# Patient Record
Sex: MALE | Race: BLACK OR AFRICAN AMERICAN | NOT HISPANIC OR LATINO | URBAN - METROPOLITAN AREA
[De-identification: names, ages, dates, MRNs, and addresses within clinical notes are randomized per-mention and may not be internally consistent; named-entity substitution may affect disease eponyms.]

---

## 2017-05-15 ENCOUNTER — EMERGENCY (EMERGENCY)
Facility: HOSPITAL | Age: 57
LOS: 1 days | Discharge: HOME | End: 2017-05-15
Admitting: EMERGENCY MEDICINE
Payer: COMMERCIAL

## 2017-05-15 VITALS
DIASTOLIC BLOOD PRESSURE: 80 MMHG | TEMPERATURE: 98 F | OXYGEN SATURATION: 100 % | RESPIRATION RATE: 16 BRPM | SYSTOLIC BLOOD PRESSURE: 117 MMHG | HEART RATE: 73 BPM

## 2017-05-15 PROCEDURE — 99284 EMERGENCY DEPT VISIT MOD MDM: CPT | Mod: 25

## 2017-05-15 NOTE — ED ADULT TRIAGE NOTE - CHIEF COMPLAINT QUOTE
Pt. states " I have these small bumps all over my face . arms and head for the past 2 weeks". Pt. arrives with small bumps on right sided face , top of head and bilateral arms. Pt. states , bumps just appeared and states they have not gotten worse. Pt. denies any itchiness or pain , no discharge noted. Pt. denies any other c/o. States PMH is HTN.

## 2017-05-16 RX ORDER — MUPIROCIN 20 MG/G
1 OINTMENT TOPICAL
Qty: 1 | Refills: 0 | OUTPATIENT
Start: 2017-05-16

## 2017-05-16 NOTE — ED PROVIDER NOTE - PROGRESS NOTE DETAILS
The scribe's documentation has been prepared under my direction and personally reviewed by me in its entirety. I confirm that the note above accurately reflects all work, treatment, procedures, and medical decision making performed by me.  Parviz Mejia PA-C

## 2017-05-16 NOTE — ED PROVIDER NOTE - OBJECTIVE STATEMENT
56 y/o M pt with PMHx of HTN c/o progressively worsening, nonpruritic rash x2 weeks. Notes the rash first developed to face after shaving 2 weeks ago. Now notes small bumps to face, scalp, chest and upper extremities. Denies pain, bleeding, drainage, fever, chills, numbness/tingling, recent travel or any other complaints. NKDA

## 2017-05-16 NOTE — ED PROVIDER NOTE - NS ED MD SCRIBE ATTENDING SCRIBE SECTIONS
HIV/REVIEW OF SYSTEMS/VITAL SIGNS( Pullset)/PHYSICAL EXAM/PAST MEDICAL/SURGICAL/SOCIAL HISTORY/DISPOSITION/HISTORY OF PRESENT ILLNESS

## 2017-05-16 NOTE — ED PROVIDER NOTE - MEDICAL DECISION MAKING DETAILS
56 y/o M pt presents to the ED with rash x2 weeks. Probable folliculitis. Plan: Bactroban cream, dermatology f/u

## 2019-02-12 ENCOUNTER — INPATIENT (INPATIENT)
Facility: HOSPITAL | Age: 59
LOS: 2 days | Discharge: HOME | DRG: 309 | End: 2019-02-15
Attending: HOSPITALIST | Admitting: HOSPITALIST
Payer: COMMERCIAL

## 2019-02-12 VITALS
TEMPERATURE: 97 F | WEIGHT: 188.94 LBS | DIASTOLIC BLOOD PRESSURE: 79 MMHG | SYSTOLIC BLOOD PRESSURE: 115 MMHG | RESPIRATION RATE: 16 BRPM | OXYGEN SATURATION: 100 % | HEART RATE: 47 BPM

## 2019-02-12 DIAGNOSIS — I10 ESSENTIAL (PRIMARY) HYPERTENSION: ICD-10-CM

## 2019-02-12 DIAGNOSIS — R00.1 BRADYCARDIA, UNSPECIFIED: ICD-10-CM

## 2019-02-12 DIAGNOSIS — L73.9 FOLLICULAR DISORDER, UNSPECIFIED: ICD-10-CM

## 2019-02-12 DIAGNOSIS — Z29.9 ENCOUNTER FOR PROPHYLACTIC MEASURES, UNSPECIFIED: ICD-10-CM

## 2019-02-12 DIAGNOSIS — R55 SYNCOPE AND COLLAPSE: ICD-10-CM

## 2019-02-12 LAB
ALBUMIN SERPL ELPH-MCNC: 3.9 G/DL — SIGNIFICANT CHANGE UP (ref 3.3–5)
ALP SERPL-CCNC: 110 U/L — SIGNIFICANT CHANGE UP (ref 40–120)
ALT FLD-CCNC: 23 U/L — SIGNIFICANT CHANGE UP (ref 10–45)
ANION GAP SERPL CALC-SCNC: 18 MMOL/L — HIGH (ref 5–17)
APPEARANCE UR: CLEAR — SIGNIFICANT CHANGE UP
APTT BLD: 25.4 SEC — LOW (ref 27.5–36.3)
AST SERPL-CCNC: 31 U/L — SIGNIFICANT CHANGE UP (ref 10–40)
BASOPHILS # BLD AUTO: 0.1 K/UL — SIGNIFICANT CHANGE UP (ref 0–0.2)
BASOPHILS NFR BLD AUTO: 0.7 % — SIGNIFICANT CHANGE UP (ref 0–2)
BILIRUB SERPL-MCNC: 0.6 MG/DL — SIGNIFICANT CHANGE UP (ref 0.2–1.2)
BILIRUB UR-MCNC: NEGATIVE — SIGNIFICANT CHANGE UP
BLD GP AB SCN SERPL QL: NEGATIVE — SIGNIFICANT CHANGE UP
BUN SERPL-MCNC: 16 MG/DL — SIGNIFICANT CHANGE UP (ref 7–23)
CALCIUM SERPL-MCNC: 9.2 MG/DL — SIGNIFICANT CHANGE UP (ref 8.4–10.5)
CHLORIDE SERPL-SCNC: 100 MMOL/L — SIGNIFICANT CHANGE UP (ref 96–108)
CO2 SERPL-SCNC: 22 MMOL/L — SIGNIFICANT CHANGE UP (ref 22–31)
COLOR SPEC: YELLOW — SIGNIFICANT CHANGE UP
CREAT SERPL-MCNC: 1.16 MG/DL — SIGNIFICANT CHANGE UP (ref 0.5–1.3)
DIFF PNL FLD: NEGATIVE — SIGNIFICANT CHANGE UP
EOSINOPHIL # BLD AUTO: 0.3 K/UL — SIGNIFICANT CHANGE UP (ref 0–0.5)
EOSINOPHIL NFR BLD AUTO: 2.9 % — SIGNIFICANT CHANGE UP (ref 0–6)
ETHANOL SERPL-MCNC: SIGNIFICANT CHANGE UP MG/DL (ref 0–10)
GLUCOSE SERPL-MCNC: 116 MG/DL — HIGH (ref 70–99)
GLUCOSE UR QL: NEGATIVE — SIGNIFICANT CHANGE UP
HCT VFR BLD CALC: 50 % — SIGNIFICANT CHANGE UP (ref 39–50)
HGB BLD-MCNC: 17.4 G/DL — HIGH (ref 13–17)
INR BLD: 1.03 RATIO — SIGNIFICANT CHANGE UP (ref 0.88–1.16)
KETONES UR-MCNC: ABNORMAL
LEUKOCYTE ESTERASE UR-ACNC: NEGATIVE — SIGNIFICANT CHANGE UP
LYMPHOCYTES # BLD AUTO: 3.7 K/UL — HIGH (ref 1–3.3)
LYMPHOCYTES # BLD AUTO: 41 % — SIGNIFICANT CHANGE UP (ref 13–44)
MCHC RBC-ENTMCNC: 32.3 PG — SIGNIFICANT CHANGE UP (ref 27–34)
MCHC RBC-ENTMCNC: 34.7 GM/DL — SIGNIFICANT CHANGE UP (ref 32–36)
MCV RBC AUTO: 93 FL — SIGNIFICANT CHANGE UP (ref 80–100)
MONOCYTES # BLD AUTO: 0.9 K/UL — SIGNIFICANT CHANGE UP (ref 0–0.9)
MONOCYTES NFR BLD AUTO: 9.8 % — SIGNIFICANT CHANGE UP (ref 2–14)
NEUTROPHILS # BLD AUTO: 4.1 K/UL — SIGNIFICANT CHANGE UP (ref 1.8–7.4)
NEUTROPHILS NFR BLD AUTO: 45.5 % — SIGNIFICANT CHANGE UP (ref 43–77)
NITRITE UR-MCNC: NEGATIVE — SIGNIFICANT CHANGE UP
PH UR: 5.5 — SIGNIFICANT CHANGE UP (ref 5–8)
PLATELET # BLD AUTO: 238 K/UL — SIGNIFICANT CHANGE UP (ref 150–400)
POTASSIUM SERPL-MCNC: 3.5 MMOL/L — SIGNIFICANT CHANGE UP (ref 3.5–5.3)
POTASSIUM SERPL-SCNC: 3.5 MMOL/L — SIGNIFICANT CHANGE UP (ref 3.5–5.3)
PROT SERPL-MCNC: 7.9 G/DL — SIGNIFICANT CHANGE UP (ref 6–8.3)
PROT UR-MCNC: ABNORMAL
PROTHROM AB SERPL-ACNC: 11.8 SEC — SIGNIFICANT CHANGE UP (ref 10–12.9)
RBC # BLD: 5.38 M/UL — SIGNIFICANT CHANGE UP (ref 4.2–5.8)
RBC # FLD: 13 % — SIGNIFICANT CHANGE UP (ref 10.3–14.5)
RH IG SCN BLD-IMP: POSITIVE — SIGNIFICANT CHANGE UP
SODIUM SERPL-SCNC: 140 MMOL/L — SIGNIFICANT CHANGE UP (ref 135–145)
SP GR SPEC: 1.02 — SIGNIFICANT CHANGE UP (ref 1.01–1.02)
TSH SERPL-MCNC: 2.28 UIU/ML — SIGNIFICANT CHANGE UP (ref 0.27–4.2)
UROBILINOGEN FLD QL: NEGATIVE — SIGNIFICANT CHANGE UP
WBC # BLD: 9 K/UL — SIGNIFICANT CHANGE UP (ref 3.8–10.5)
WBC # FLD AUTO: 9 K/UL — SIGNIFICANT CHANGE UP (ref 3.8–10.5)

## 2019-02-12 PROCEDURE — 99255 IP/OBS CONSLTJ NEW/EST HI 80: CPT | Mod: GC

## 2019-02-12 PROCEDURE — 12002 RPR S/N/AX/GEN/TRNK2.6-7.5CM: CPT

## 2019-02-12 PROCEDURE — 99285 EMERGENCY DEPT VISIT HI MDM: CPT | Mod: 25

## 2019-02-12 PROCEDURE — 71045 X-RAY EXAM CHEST 1 VIEW: CPT | Mod: 26

## 2019-02-12 PROCEDURE — 70450 CT HEAD/BRAIN W/O DYE: CPT | Mod: 26

## 2019-02-12 PROCEDURE — 99223 1ST HOSP IP/OBS HIGH 75: CPT

## 2019-02-12 PROCEDURE — 72125 CT NECK SPINE W/O DYE: CPT | Mod: 26

## 2019-02-12 PROCEDURE — 93010 ELECTROCARDIOGRAM REPORT: CPT | Mod: 59

## 2019-02-12 PROCEDURE — 72170 X-RAY EXAM OF PELVIS: CPT | Mod: 26

## 2019-02-12 RX ORDER — CEPHALEXIN 500 MG
500 CAPSULE ORAL
Qty: 0 | Refills: 0 | Status: DISCONTINUED | OUTPATIENT
Start: 2019-02-12 | End: 2019-02-14

## 2019-02-12 RX ORDER — SODIUM CHLORIDE 9 MG/ML
1000 INJECTION INTRAMUSCULAR; INTRAVENOUS; SUBCUTANEOUS
Qty: 0 | Refills: 0 | Status: DISCONTINUED | OUTPATIENT
Start: 2019-02-12 | End: 2019-02-14

## 2019-02-12 RX ORDER — SODIUM CHLORIDE 9 MG/ML
1000 INJECTION INTRAMUSCULAR; INTRAVENOUS; SUBCUTANEOUS ONCE
Qty: 0 | Refills: 0 | Status: COMPLETED | OUTPATIENT
Start: 2019-02-12 | End: 2019-02-12

## 2019-02-12 RX ORDER — ACETAMINOPHEN 500 MG
650 TABLET ORAL EVERY 6 HOURS
Qty: 0 | Refills: 0 | Status: DISCONTINUED | OUTPATIENT
Start: 2019-02-12 | End: 2019-02-15

## 2019-02-12 RX ORDER — TETANUS TOXOID, REDUCED DIPHTHERIA TOXOID AND ACELLULAR PERTUSSIS VACCINE, ADSORBED 5; 2.5; 8; 8; 2.5 [IU]/.5ML; [IU]/.5ML; UG/.5ML; UG/.5ML; UG/.5ML
0.5 SUSPENSION INTRAMUSCULAR ONCE
Qty: 0 | Refills: 0 | Status: COMPLETED | OUTPATIENT
Start: 2019-02-12 | End: 2019-02-12

## 2019-02-12 RX ADMIN — SODIUM CHLORIDE 50 MILLILITER(S): 9 INJECTION INTRAMUSCULAR; INTRAVENOUS; SUBCUTANEOUS at 17:49

## 2019-02-12 RX ADMIN — Medication 500 MILLIGRAM(S): at 17:38

## 2019-02-12 RX ADMIN — TETANUS TOXOID, REDUCED DIPHTHERIA TOXOID AND ACELLULAR PERTUSSIS VACCINE, ADSORBED 0.5 MILLILITER(S): 5; 2.5; 8; 8; 2.5 SUSPENSION INTRAMUSCULAR at 03:02

## 2019-02-12 RX ADMIN — SODIUM CHLORIDE 3000 MILLILITER(S): 9 INJECTION INTRAMUSCULAR; INTRAVENOUS; SUBCUTANEOUS at 03:29

## 2019-02-12 NOTE — H&P ADULT - HISTORY OF PRESENT ILLNESS
58 year old female with PMH of HTN; presented after fall down stairs at home with some head trauma and LOC after feeling dizzy. Patient restarted taking his BP meds (atenolol-chlorthalidone) about 3 days ago after not taking for about 8 months or so. He took them on his own, he did not see or speak with his physician. In ED he had a posterior head laceration s/p staples. He denies CP, SOB, N/V, diarrhea, cough, fevers, dysuria, HA, weakness, or vision changes. He says his son was there when he fell and him and his wife brought him to the hospital.   In ED patient given 1LNS bolus and Tdap. 58 year old female with PMH of HTN; presented after fall down stairs at home with some head trauma and LOC after feeling dizzy. Patient restarted taking his BP meds (atenolol-chlorthalidone) about 3 days ago after not taking for about 8 months or so. He took them on his own, he did not see or speak with his physician. In ED he had a posterior head laceration s/p staples. He denies CP, SOB, N/V, diarrhea, cough, fevers, dysuria, HA, weakness, or vision changes. He says his son was there when he fell and him and his wife brought him to the hospital. In ED he was found to be bradycardic.   In ED patient given 1LNS bolus and Tdap.

## 2019-02-12 NOTE — ED PROCEDURE NOTE - ATTENDING CONTRIBUTION TO CARE
I supervised the procedure performed by the resident. I reviewed the resident's note and agree with the documented findings.

## 2019-02-12 NOTE — H&P ADULT - NSHPLABSRESULTS_GEN_ALL_CORE
LABS:                        17.4   9.0   )-----------( 238      ( 2019 03:14 )             50.0           140  |  100  |  16  ----------------------------<  116<H>  3.5   |  22  |  1.16    Ca    9.2      2019 03:14    TPro  7.9  /  Alb  3.9  /  TBili  0.6  /  DBili  x   /  AST  31  /  ALT  23  /  AlkPhos  110  12          CAPILLARY BLOOD GLUCOSE      POCT Blood Glucose.: 90 mg/dL (2019 01:53)      PT/INR - ( 2019 03:14 )   PT: 11.8 sec;   INR: 1.03 ratio         PTT - ( 2019 03:14 )  PTT:25.4 sec    Lactate Trend    Urinalysis Basic - ( 2019 08:41 )    Color: Yellow / Appearance: Clear / S.019 / pH: x  Gluc: x / Ketone: Small  / Bili: Negative / Urobili: Negative   Blood: x / Protein: Trace / Nitrite: Negative   Leuk Esterase: Negative / RBC: 2 /hpf / WBC 3 /HPF   Sq Epi: x / Non Sq Epi: 1 /hpf / Bacteria: Negative    RADIOLOGY:    < from: CT Head No Cont (19 @ 02:48) >    IMPRESSION:     Head CT: No evidence for calvarial fracture or acute intracranial   hemorrhage. If symptoms persist, consider short interval follow-up head   CT or brain MRI follow-up if there are no MRI contraindications.    Cervical spine CT: No evidence for acute cervical spine fracture.   Degenerative changes as described. If symptoms persist, consider cervical   spine MRI follow-up if there are no MRI contraindications. Flexion and   extension radiographs may also be of value.    < end of copied text >    < from: Xray Pelvis AP only (19 @ 02:56) >    IMPRESSION:    No acute pelvic ring fracture.    < end of copied text >    < from: Xray Chest 1 View AP/PA (19 @ 02:55) >    IMPRESSION:  No acute pulmonary disease.    < end of copied text >    CARDIOLOGY:    EKG reviewed by me: Sinus azeb HR 47, Qtc 412, Early repolarization.     Telemetry reviewed by me: Sinus 40-70's.

## 2019-02-12 NOTE — H&P ADULT - NSHPREVIEWOFSYSTEMS_GEN_ALL_CORE
REVIEW OF SYSTEMS:    CONSTITUTIONAL: Was having dizziness. No weakness, fevers or chills  ENMT:  No ear pain, No vertigo or throat pain  EYES: No visual changes  or photophobia  NECK: No pain or stiffness  RESPIRATORY: No cough, wheezing, hemoptysis; No shortness of breath  CARDIOVASCULAR: No chest pain or palpitations  GASTROINTESTINAL: No abdominal or epigastric pain. No nausea, vomiting, or hematemesis; No diarrhea or constipation. No melena or hematochezia.  MUSCULOSKELETAL: No joint swelling or warmth.  GENITOURINARY: No dysuria, frequency or hematuria  NEUROLOGICAL: No numbness or weakness  PSYCHIATRIC: No depression or anhedonia.  ENDOCRINE: No sx hypoglycemic episodes.  SKIN: Small skin lesion superior to umbilicus. Posterior head laceration.

## 2019-02-12 NOTE — H&P ADULT - PROBLEM SELECTOR PLAN 2
-Hold home atenolol-chlorthalidone for now and observe.   -If patient still needs antihypertensive, will likely start something low dose which doesn't affect the heart rate.

## 2019-02-12 NOTE — CONSULT NOTE ADULT - ASSESSMENT
Pt is a 58 year old man with PMHx of HTN, presenting after fall down stairs at home with some head trauma and LOC after feeling dizzy, found to be bradycardic in the ED.    #Syncope: Bradycardic to 30s in the ED in the setting of restarting his atenolol. Denies chest pain, shortness of breath. No previous hx of syncope.   -Hold any AVN blocking agent  -TTE in the AM  -Telemetry monitoring

## 2019-02-12 NOTE — H&P ADULT - NSHPSOCIALHISTORY_GEN_ALL_CORE
and has a son. Says he lives with his family. Drinks alcohol about twice per week. Smokes pot occasionally. No tobacco use. Retired .

## 2019-02-12 NOTE — H&P ADULT - PROBLEM SELECTOR PLAN 4
-SCD's for DVT PPx.   -Ambulate with assistance. Fall precautions.   -Consider PT eval if patient doesn't improve after withholding BB.    5. Dispo: -Explained to patient we should observe at least a day until his HR and symptoms improve. He will also need close outpatient PMD follow up within a week.

## 2019-02-12 NOTE — CONSULT NOTE ADULT - SUBJECTIVE AND OBJECTIVE BOX
Patient seen and evaluated at bedside    Chief Complaint: Fall at home    HPI:  Pt is a 58 year old man with PMHx of HTN, presenting after fall down stairs at home with some head trauma and LOC after feeling dizzy. Patient restarted taking his BP meds (atenolol-chlorthalidone) about 3 days ago after not taking for about 8 months or so. He took them on his own, he did not see or speak with his physician. In ED he had a posterior head laceration s/p staples. He denies CP, SOB, N/V, diarrhea, cough, fevers, dysuria, HA, weakness, or vision changes. He says his son was there when he fell and him and his wife brought him to the hospital. In ED he was found to be bradycardic to 30-50s, otherwise HDS. HsTrop <6. Given 1 L NS      PMH:   Essential hypertension      PSH:   No significant past surgical history      Medications:   acetaminophen   Tablet .. 650 milliGRAM(s) Oral every 6 hours PRN  cephalexin 500 milliGRAM(s) Oral four times a day      Allergies:  No Known Allergies      FAMILY HISTORY:  Family history of diabetes mellitus (Father)      Social History:  Smoking History:  Alcohol Use:  Drug Use:    Review of Systems:  REVIEW OF SYSTEMS:  CONSTITUTIONAL: No weakness, fevers or chills  EYES/ENT: No visual changes;  No dysphagia  NECK: No pain or stiffness  RESPIRATORY: No cough, wheezing, hemoptysis; No shortness of breath  CARDIOVASCULAR: No chest pain or palpitations; No lower extremity edema  GASTROINTESTINAL: No abdominal or epigastric pain. No nausea, vomiting, or hematemesis; No diarrhea or constipation. No melena or hematochezia.  BACK: No back pain  GENITOURINARY: No dysuria, frequency or hematuria  NEUROLOGICAL: No numbness or weakness  SKIN: No itching, burning, rashes, or lesions   All other review of systems is negative unless indicated above.    Physical Exam:  T(F): 97.5 (02-12), Max: 98.1 (02-12)  HR: 57 (02-12) (47 - 57)  BP: 146/86 (02-12) (115/79 - 147/80)  RR: 16 (02-12)  SpO2: 100% (02-12)  GENERAL: No acute distress, well-developed  HEAD:  Dressing covering posterior head laceration  ENT: Neck supple, No JVD, moist mucosa  CHEST/LUNG: Clear to auscultation bilaterally; No wheeze, equal breath sounds bilaterally   HEART: Bradycardic, regular; No murmurs, rubs, or gallops  ABDOMEN: Soft, Nontender, Nondistended; Bowel sounds present  EXTREMITIES:  No clubbing, cyanosis, or edema  PSYCH: Nl behavior, nl affect  SKIN: Normal color, No rashes or lesions  LINES:    Cardiovascular Diagnostic Testing:    ECG: Personally reviewed    Echo:    Stress Testing:    Cath:    Interpretation of Telemetry:    Imaging:    Labs: Personally reviewed                        17.4   9.0   )-----------( 238      ( 12 Feb 2019 03:14 )             50.0     02-12    140  |  100  |  16  ----------------------------<  116<H>  3.5   |  22  |  1.16    Ca    9.2      12 Feb 2019 03:14    TPro  7.9  /  Alb  3.9  /  TBili  0.6  /  DBili  x   /  AST  31  /  ALT  23  /  AlkPhos  110  02-12    PT/INR - ( 12 Feb 2019 03:14 )   PT: 11.8 sec;   INR: 1.03 ratio         PTT - ( 12 Feb 2019 03:14 )  PTT:25.4 sec

## 2019-02-12 NOTE — ED PROVIDER NOTE - CLINICAL SUMMARY MEDICAL DECISION MAKING FREE TEXT BOX
DDx: syncope, head laceration. Will order cardiac workup, close hemodynamic monitoring, reassess. Will need admission.  ATTG: Dr. Samano

## 2019-02-12 NOTE — ED ADULT NURSE REASSESSMENT NOTE - NS ED NURSE REASSESS COMMENT FT1
Report received from RN Lorena Yusuf and DUNCAN Arredondo in critical care. Patient appears to be resting comfortably in stretcher. Patient denies any dizziness, n/v/d, numbness, tingling, SOB, vision changes, headache. Cardiac monitor in place. Patient remains sinus bradycardic to 50s, ED MD Barnes aware. A&OX3 with intermittent periods of confusion. Safety and comfort measures provided.

## 2019-02-12 NOTE — CONSULT NOTE ADULT - ATTENDING COMMENTS
This note was reviewed, amended and signed yesterday, unclear why the signed note has vanished. As stated yesterday:  58 year old man presents with traumatic syncope, observe sinus bradycardia, occurring within a day of resuming atenolol which he had stopped months earlier. Exam otherwise unremarkable. Need cardiac echo to evaluate LV function and monitor on telemetry for arrhythmias, symptomatic bradycardia, heart block.

## 2019-02-12 NOTE — H&P ADULT - PROBLEM SELECTOR PLAN 1
-S/p fall at home after becoming dizzy in setting of recently resuming atenolol-chlorthalidone on his own as an outpatient. He says he was taking ?BID atenolol 100mg, which is a high dose. -Likely bradycardia and dizziness/fall due to excessive beta-blocker.   -Trops negative, no ischemic changes seen on EKG. -CT head negative for any acute findings. Repeat CT head if worsens.   -Hold beta-blocker and antihypertensives for now.   -C/w telemetry.   -Consider echo and cardiology eval if HR and symptoms don't improve after withholding BB.   -F/u TSH.   -Check orthostatics.  -S/p posterior head laceration stapling in ED. Will need to return to have staples removed in about 10 days.

## 2019-02-12 NOTE — ED PROVIDER NOTE - OBJECTIVE STATEMENT
Resident: 58y M PMH HTN BiBEMS from home after fall down stairs. Was walking down stairs when became dizzy and fell down approx 7 steps, +loc. Required assistance to get up to standing, had been ambulatory since event. Came to emergency department due to laceration on scalp.

## 2019-02-12 NOTE — H&P ADULT - NSHPPHYSICALEXAM_GEN_ALL_CORE
PHYSICAL EXAM:  Vital Signs Last 24 Hrs  T(C): 36.7 (02-12-19 @ 11:59)  T(F): 98.1 (02-12-19 @ 11:59), Max: 98.1 (02-12-19 @ 11:59)  HR: 55 (02-12-19 @ 11:59) (47 - 55)  BP: 147/80 (02-12-19 @ 11:59)  BP(mean): --  RR: 16 (02-12-19 @ 11:59) (16 - 17)  SpO2: 98% (02-12-19 @ 11:59) (98% - 100%)  Wt(kg): --    Constitutional: NAD, awake and alert  EYES: EOMI  ENT:  Normal Hearing   Neck: Soft and supple  Respiratory: Clear except mildly reduced BS on left side.   Cardiovascular: S1S2 normal, bradycardic rate and rhythm, no Murmurs, gallops or rubs  Gastrointestinal: Bowel Sounds present, soft, nontender, nondistended, no guarding, no rebound  Extremities: No LE edema; warm to touch  Neurological: AAO x 3, no focal deficits  Musculoskeletal: 5/5 strength b/l upper and lower extremities  Skin: Small areas of folliculitis/early cellulitis superior to umbilicus. Posterior head laceration s/p staples.   Psych: No depression or anhedonia  Heme: No nose bleeds

## 2019-02-12 NOTE — ED PROVIDER NOTE - PHYSICAL EXAMINATION
Resident:   Gen: no acute distress  Head: 5cm linear lac posterior scalp  ENT: PERRL, MMM  Neck: supple with full ROM   Chest: CTAB, no retractions, rate normal, appears to breathe comfortably  Heart: azeb regular S1S2, No peripheral edema, bilateral pulses in arms and legs  Abd: Soft non-tender, no rebound or guarding  Back: No spinal deformity, no CVAT  Ext: Moving all 4 extremities without obvious impairment to ROM, no obvious weakness  Neuro: fluid speech  Psych: No anxiety, depression or pressured speech noted  Skin: abrasion right posterior shoulder

## 2019-02-12 NOTE — H&P ADULT - ATTENDING COMMENTS
Hermann Granger MD  Division of [facility] Medicine  Cell: [phone]  Pager: [phone]  Office: [phone]

## 2019-02-12 NOTE — ED PROVIDER NOTE - NS ED ROS FT
Constitutional: no fever, no chills.  Eyes: no visual changes.  ENMT: no sore throat.  CV: no chest pain.  Resp: no cough, no shortness of breath.  GI: no abdominal pain, no nausea, no vomiting, no diarrhea.  : no dysuria, no hematuria.  MSK: no back pain, no neck pain.  Skin: no rashes.  Neuro: no headache, +loss of consciousness, no weakness, no numbness, no tingling.

## 2019-02-13 LAB
ANION GAP SERPL CALC-SCNC: 15 MMOL/L — SIGNIFICANT CHANGE UP (ref 5–17)
BUN SERPL-MCNC: 14 MG/DL — SIGNIFICANT CHANGE UP (ref 7–23)
CALCIUM SERPL-MCNC: 9.4 MG/DL — SIGNIFICANT CHANGE UP (ref 8.4–10.5)
CHLORIDE SERPL-SCNC: 102 MMOL/L — SIGNIFICANT CHANGE UP (ref 96–108)
CO2 SERPL-SCNC: 22 MMOL/L — SIGNIFICANT CHANGE UP (ref 22–31)
CREAT SERPL-MCNC: 0.95 MG/DL — SIGNIFICANT CHANGE UP (ref 0.5–1.3)
GLUCOSE SERPL-MCNC: 77 MG/DL — SIGNIFICANT CHANGE UP (ref 70–99)
HCT VFR BLD CALC: 51 % — HIGH (ref 39–50)
HGB BLD-MCNC: 17.2 G/DL — HIGH (ref 13–17)
MAGNESIUM SERPL-MCNC: 2 MG/DL — SIGNIFICANT CHANGE UP (ref 1.6–2.6)
MCHC RBC-ENTMCNC: 31.3 PG — SIGNIFICANT CHANGE UP (ref 27–34)
MCHC RBC-ENTMCNC: 33.7 GM/DL — SIGNIFICANT CHANGE UP (ref 32–36)
MCV RBC AUTO: 92.8 FL — SIGNIFICANT CHANGE UP (ref 80–100)
PHOSPHATE SERPL-MCNC: 2.6 MG/DL — SIGNIFICANT CHANGE UP (ref 2.5–4.5)
PLATELET # BLD AUTO: 194 K/UL — SIGNIFICANT CHANGE UP (ref 150–400)
POTASSIUM SERPL-MCNC: 3.3 MMOL/L — LOW (ref 3.5–5.3)
POTASSIUM SERPL-SCNC: 3.3 MMOL/L — LOW (ref 3.5–5.3)
RBC # BLD: 5.5 M/UL — SIGNIFICANT CHANGE UP (ref 4.2–5.8)
RBC # FLD: 13.1 % — SIGNIFICANT CHANGE UP (ref 10.3–14.5)
SODIUM SERPL-SCNC: 139 MMOL/L — SIGNIFICANT CHANGE UP (ref 135–145)
WBC # BLD: 6.3 K/UL — SIGNIFICANT CHANGE UP (ref 3.8–10.5)
WBC # FLD AUTO: 6.3 K/UL — SIGNIFICANT CHANGE UP (ref 3.8–10.5)

## 2019-02-13 PROCEDURE — 99233 SBSQ HOSP IP/OBS HIGH 50: CPT | Mod: GC

## 2019-02-13 PROCEDURE — 93010 ELECTROCARDIOGRAM REPORT: CPT

## 2019-02-13 PROCEDURE — 99233 SBSQ HOSP IP/OBS HIGH 50: CPT

## 2019-02-13 RX ORDER — POTASSIUM CHLORIDE 20 MEQ
40 PACKET (EA) ORAL EVERY 4 HOURS
Qty: 0 | Refills: 0 | Status: COMPLETED | OUTPATIENT
Start: 2019-02-13 | End: 2019-02-13

## 2019-02-13 RX ADMIN — Medication 40 MILLIEQUIVALENT(S): at 09:10

## 2019-02-13 RX ADMIN — Medication 500 MILLIGRAM(S): at 00:11

## 2019-02-13 RX ADMIN — Medication 500 MILLIGRAM(S): at 23:11

## 2019-02-13 RX ADMIN — Medication 40 MILLIEQUIVALENT(S): at 13:15

## 2019-02-13 RX ADMIN — Medication 500 MILLIGRAM(S): at 05:38

## 2019-02-13 RX ADMIN — Medication 500 MILLIGRAM(S): at 11:06

## 2019-02-13 RX ADMIN — Medication 500 MILLIGRAM(S): at 17:09

## 2019-02-13 NOTE — PROGRESS NOTE ADULT - SUBJECTIVE AND OBJECTIVE BOX
Patient is a 58y old  Male who presents with a chief complaint of S/p fall with dizziness (2019 09:49)      SUBJECTIVE / OVERNIGHT EVENTS: sinus 48s on tele, no cp, sob, chills, abdominal pain     MEDICATIONS  (STANDING):  cephalexin 500 milliGRAM(s) Oral four times a day  sodium chloride 0.9%. 1000 milliLiter(s) (50 mL/Hr) IV Continuous <Continuous>    MEDICATIONS  (PRN):  acetaminophen   Tablet .. 650 milliGRAM(s) Oral every 6 hours PRN Mild Pain (1 - 3), Moderate Pain (4 - 6)        CAPILLARY BLOOD GLUCOSE        I&O's Summary    2019 07:  -  2019 07:00  --------------------------------------------------------  IN: 840 mL / OUT: 550 mL / NET: 290 mL    2019 07:01  -  2019 15:22  --------------------------------------------------------  IN: 480 mL / OUT: 0 mL / NET: 480 mL        PHYSICAL EXAM:  GENERAL: NAD, well-developed  HEAD:  Atraumatic, Normocephalic  EYES: EOMI, PERRLA, conjunctiva and sclera clear  NECK: Supple, No JVD  CHEST/LUNG: Clear to auscultation bilaterally; No wheeze  HEART: Regular rate and rhythm; No murmurs, rubs, or gallops  ABDOMEN: Soft, Nontender, Nondistended; Bowel sounds present  EXTREMITIES:  2+ Peripheral Pulses, No clubbing, cyanosis, or edema  PSYCH: AAOx3  NEUROLOGY: non-focal  SKIN: No rashes or lesions    LABS:                        17.2   6.3   )-----------( 194      ( 2019 07:17 )             51.0     02-13    139  |  102  |  14  ----------------------------<  77  3.3<L>   |  22  |  0.95    Ca    9.4      2019 07:15  Phos  2.6     02-13  Mg     2.0     02-13    TPro  7.9  /  Alb  3.9  /  TBili  0.6  /  DBili  x   /  AST  31  /  ALT  23  /  AlkPhos  110  02-12    PT/INR - ( 2019 03:14 )   PT: 11.8 sec;   INR: 1.03 ratio         PTT - ( 2019 03:14 )  PTT:25.4 sec      Urinalysis Basic - ( 2019 08:41 )    Color: Yellow / Appearance: Clear / S.019 / pH: x  Gluc: x / Ketone: Small  / Bili: Negative / Urobili: Negative   Blood: x / Protein: Trace / Nitrite: Negative   Leuk Esterase: Negative / RBC: 2 /hpf / WBC 3 /HPF   Sq Epi: x / Non Sq Epi: 1 /hpf / Bacteria: Negative        RADIOLOGY & ADDITIONAL TESTS:    Imaging Personally Reviewed:    Consultant(s) Notes Reviewed:  cardio    Care Discussed with Consultants/Other Providers:

## 2019-02-13 NOTE — PROGRESS NOTE ADULT - PROBLEM SELECTOR PLAN 1
-S/p fall at home after becoming dizzy in setting of recently resuming atenolol-chlorthalidone on his own as an outpatient. He says he was taking ?BID atenolol 100mg, which is a high dose.  -Likely bradycardia and dizziness/fall due to excessive beta-blocker.   -Trops negative, no ischemic changes seen on EKG.   -CT head negative for any acute findings.   -Hold beta-blocker/ av chepe blocking agents   -C/w telemetry.   -check echo  -cards follow up   -F/u TSH.   -orthostatics negative   -S/p posterior head laceration stapling in ED. Will need to return to have staples removed in about 10 days.

## 2019-02-13 NOTE — PROGRESS NOTE ADULT - PROBLEM SELECTOR PLAN 3
-Patient has small area of folliculitis/early cellulitic changes above his navel. He says some pus has come out before, but not currently on my exam. He says he may have shaved the area recently. Possibly related to this.   -Will treat with 5 day course of Keflex.   -Advised him to follow up with PMD next week and also keep the area clean.

## 2019-02-13 NOTE — PROGRESS NOTE ADULT - ASSESSMENT
Pt is a 58 year old man with PMHx of HTN, presenting after fall down stairs at home with some head trauma and LOC after feeling dizzy in the setting of restarting his atenolol 24 hours prior, found to be bradycardic in the ED.    #Syncope: Bradycardic to 30s in the ED, now 40-60's with occasional drop to the 30's.  -Hold any AVN blocking agent  -TTE today  -c/w Telemetry monitoring

## 2019-02-13 NOTE — PROVIDER CONTACT NOTE (OTHER) - ASSESSMENT
Patient A&Ox4, VSS. HR 57 sinus bradycardia, /77, RR 17, O2 saturation 98% on room air. Patient was asleep at time of event. Patient easily arousable and denies chest pain, lightheadedness, or shortness of breath. Patient is asymptomatic. Patient has R2 Pads on.

## 2019-02-13 NOTE — CHART NOTE - NSCHARTNOTEFT_GEN_A_CORE
[facility] Medicine Event Note    Notified by RN for episode of bradycardia on tele, down to 38 back to high 40s. Pt asymptomatic and sleeping at time.  Will check labs, R2 pads, EKG   briefly went down to 38 on tele, back to high 40s;will check labs;R2 pads, EKG    Jill Topete PA-C  Dept of Medicine  [id number]

## 2019-02-13 NOTE — PROGRESS NOTE ADULT - SUBJECTIVE AND OBJECTIVE BOX
Patient seen and examined at bedside.    Overnight Events: No acute events overnight. Pt seen and examined at bedside, laying comfortably in NAD.  Pt jacqueline chest pain, palpitations, shortness of breath, lightheadedness, dizziness, headache, abd pain pain, nausea, vomiting or diaphoresis.     Review Of Systems: No chest pain, shortness of breath, or palpitations            Medications:  acetaminophen   Tablet .. 650 milliGRAM(s) Oral every 6 hours PRN  cephalexin 500 milliGRAM(s) Oral four times a day  potassium chloride    Tablet ER 40 milliEquivalent(s) Oral every 4 hours  sodium chloride 0.9%. 1000 milliLiter(s) IV Continuous <Continuous>      PAST MEDICAL & SURGICAL HISTORY:  Essential hypertension  No significant past surgical history      Vitals:  T(F): 98.6 (02-13), Max: 98.6 (02-13)  HR: 57 (02-13) (48 - 60)  BP: 135/77 (02-13) (113/73 - 162/87)  RR: 17 (02-13)  SpO2: 98% (02-13)  I&O's Summary    12 Feb 2019 07:01  -  13 Feb 2019 07:00  --------------------------------------------------------  IN: 840 mL / OUT: 550 mL / NET: 290 mL        Physical Exam:  Appearance: No acute distress; well appearing  Eyes: PERRL, EOMI, pink conjunctiva  HENT:  posterior head laceration.  Cardiovascular: RRR, S1, S2, no murmurs, rubs, or gallops; no edema; no JVD  Respiratory: Clear to auscultation bilaterally  Gastrointestinal: soft, non-tender, non-distended with normal bowel sounds  Musculoskeletal: No clubbing; no joint deformity   Neurologic: Non-focal  Lymphatic: No lymphadenopathy  Psychiatry: AAOx3, mood & affect appropriate  Skin: No rashes, ecchymoses, or cyanosis                          17.2   6.3   )-----------( 194      ( 13 Feb 2019 07:17 )             51.0     02-13    139  |  102  |  14  ----------------------------<  77  3.3<L>   |  22  |  0.95    Ca    9.4      13 Feb 2019 07:15  Phos  2.6     02-13  Mg     2.0     02-13    TPro  7.9  /  Alb  3.9  /  TBili  0.6  /  DBili  x   /  AST  31  /  ALT  23  /  AlkPhos  110  02-12    PT/INR - ( 12 Feb 2019 03:14 )   PT: 11.8 sec;   INR: 1.03 ratio         PTT - ( 12 Feb 2019 03:14 )  PTT:25.4 sec              New ECG(s): Personally reviewed    Echo:    Stress Testing:     Cath:    Imaging:    Interpretation of Telemetry:  sinus rythm 40-60's with occasional rates in the 30's, no evidence of heart block. Patient seen and examined at bedside.    Overnight Events: No acute events overnight. Pt seen and examined at bedside, laying comfortably in NAD.  Pt jacqueline chest pain, palpitations, shortness of breath, lightheadedness, dizziness, headache, abd pain pain, nausea, vomiting or diaphoresis.     Review Of Systems:   CONSTITUTIONAL: no fevers or chills  EYES/ENT: No visual changes;  No vertigo or throat pain   NECK: No pain or stiffness  RESPIRATORY: No cough, wheezing. No shortness of breath  CARDIOVASCULAR: No chest pain or palpitations  GASTROINTESTINAL: No abdominal or epigastric pain. No nausea, vomiting. No diarrhea. No melena.  GENITOURINARY: No dysuria, frequency or hematuria  NEUROLOGICAL: No numbness or weakness  SKIN: No itching, burning, rashes, or lesions   All other review of systems is negative unless indicated above.            Medications:  acetaminophen   Tablet .. 650 milliGRAM(s) Oral every 6 hours PRN  cephalexin 500 milliGRAM(s) Oral four times a day  potassium chloride    Tablet ER 40 milliEquivalent(s) Oral every 4 hours  sodium chloride 0.9%. 1000 milliLiter(s) IV Continuous <Continuous>      PAST MEDICAL & SURGICAL HISTORY:  Essential hypertension  No significant past surgical history      Vitals:  T(F): 98.6 (02-13), Max: 98.6 (02-13)  HR: 57 (02-13) (48 - 60)  BP: 135/77 (02-13) (113/73 - 162/87)  RR: 17 (02-13)  SpO2: 98% (02-13)  I&O's Summary    12 Feb 2019 07:01  -  13 Feb 2019 07:00  --------------------------------------------------------  IN: 840 mL / OUT: 550 mL / NET: 290 mL        Physical Exam:  Appearance: No acute distress; well appearing  Eyes: PERRL, EOMI, pink conjunctiva  HENT:  posterior head laceration.  Cardiovascular: RRR, S1, S2, no murmurs, rubs, or gallops; no edema; no JVD  Respiratory: Clear to auscultation bilaterally  Gastrointestinal: soft, non-tender, non-distended with normal bowel sounds  Musculoskeletal: No clubbing; no joint deformity   Neurologic: Non-focal  Lymphatic: No lymphadenopathy  Psychiatry: AAOx3, mood & affect appropriate  Skin: No rashes, ecchymoses, or cyanosis                          17.2   6.3   )-----------( 194      ( 13 Feb 2019 07:17 )             51.0     02-13    139  |  102  |  14  ----------------------------<  77  3.3<L>   |  22  |  0.95    Ca    9.4      13 Feb 2019 07:15  Phos  2.6     02-13  Mg     2.0     02-13    TPro  7.9  /  Alb  3.9  /  TBili  0.6  /  DBili  x   /  AST  31  /  ALT  23  /  AlkPhos  110  02-12    PT/INR - ( 12 Feb 2019 03:14 )   PT: 11.8 sec;   INR: 1.03 ratio         PTT - ( 12 Feb 2019 03:14 )  PTT:25.4 sec        New ECG(s): Personally reviewed    Imaging:    Interpretation of Telemetry:  sinus rythm 40-60's with occasional rates in the 30's, no evidence of heart block.

## 2019-02-13 NOTE — PROGRESS NOTE ADULT - ASSESSMENT
58 year old female with PMH of HTN; presented after fall down stairs at home with some head trauma and LOC after feeling dizzy. Patient restarted taking his BP meds (atenolol-chlorthalidone) about 3 days ago after not taking for about 8 months or so.  Admitted for symptomatic bradycardia, likely due to atenolol side effect.

## 2019-02-14 DIAGNOSIS — R55 SYNCOPE AND COLLAPSE: ICD-10-CM

## 2019-02-14 LAB
ANION GAP SERPL CALC-SCNC: 12 MMOL/L — SIGNIFICANT CHANGE UP (ref 5–17)
BUN SERPL-MCNC: 13 MG/DL — SIGNIFICANT CHANGE UP (ref 7–23)
CALCIUM SERPL-MCNC: 9 MG/DL — SIGNIFICANT CHANGE UP (ref 8.4–10.5)
CHLORIDE SERPL-SCNC: 102 MMOL/L — SIGNIFICANT CHANGE UP (ref 96–108)
CO2 SERPL-SCNC: 24 MMOL/L — SIGNIFICANT CHANGE UP (ref 22–31)
CREAT SERPL-MCNC: 0.88 MG/DL — SIGNIFICANT CHANGE UP (ref 0.5–1.3)
GLUCOSE SERPL-MCNC: 96 MG/DL — SIGNIFICANT CHANGE UP (ref 70–99)
MAGNESIUM SERPL-MCNC: 1.9 MG/DL — SIGNIFICANT CHANGE UP (ref 1.6–2.6)
POTASSIUM SERPL-MCNC: 3.5 MMOL/L — SIGNIFICANT CHANGE UP (ref 3.5–5.3)
POTASSIUM SERPL-SCNC: 3.5 MMOL/L — SIGNIFICANT CHANGE UP (ref 3.5–5.3)
SODIUM SERPL-SCNC: 138 MMOL/L — SIGNIFICANT CHANGE UP (ref 135–145)

## 2019-02-14 PROCEDURE — 99233 SBSQ HOSP IP/OBS HIGH 50: CPT

## 2019-02-14 PROCEDURE — 93306 TTE W/DOPPLER COMPLETE: CPT | Mod: 26

## 2019-02-14 PROCEDURE — 99233 SBSQ HOSP IP/OBS HIGH 50: CPT | Mod: GC

## 2019-02-14 RX ORDER — CEPHALEXIN 500 MG
500 CAPSULE ORAL EVERY 12 HOURS
Qty: 0 | Refills: 0 | Status: DISCONTINUED | OUTPATIENT
Start: 2019-02-14 | End: 2019-02-15

## 2019-02-14 RX ORDER — LACTOBACILLUS ACIDOPHILUS 100MM CELL
1 CAPSULE ORAL
Qty: 0 | Refills: 0 | Status: DISCONTINUED | OUTPATIENT
Start: 2019-02-14 | End: 2019-02-15

## 2019-02-14 RX ORDER — POTASSIUM CHLORIDE 20 MEQ
40 PACKET (EA) ORAL ONCE
Qty: 0 | Refills: 0 | Status: COMPLETED | OUTPATIENT
Start: 2019-02-14 | End: 2019-02-14

## 2019-02-14 RX ORDER — MAGNESIUM SULFATE 500 MG/ML
1 VIAL (ML) INJECTION ONCE
Qty: 0 | Refills: 0 | Status: COMPLETED | OUTPATIENT
Start: 2019-02-14 | End: 2019-02-14

## 2019-02-14 RX ADMIN — Medication 40 MILLIEQUIVALENT(S): at 14:32

## 2019-02-14 RX ADMIN — Medication 100 GRAM(S): at 14:33

## 2019-02-14 RX ADMIN — Medication 500 MILLIGRAM(S): at 17:04

## 2019-02-14 RX ADMIN — Medication 1 TABLET(S): at 17:04

## 2019-02-14 RX ADMIN — Medication 500 MILLIGRAM(S): at 11:33

## 2019-02-14 RX ADMIN — Medication 500 MILLIGRAM(S): at 05:07

## 2019-02-14 NOTE — PROGRESS NOTE ADULT - PROBLEM SELECTOR PLAN 1
-S/p fall at home after becoming dizzy in setting of recently resuming atenolol-chlorthalidone on his own as an outpatient. He says he was taking ?BID atenolol 100mg, which is a high dose.  -Likely bradycardia and dizziness/fall due to excessive beta-blocker.   -Trops negative, no ischemic changes seen on EKG.   -CT head negative for any acute findings.   -Hold beta-blocker/ av chepe blocking agents   -C/w telemetry.   -echo with normal lv/rv function   -cards follow up   -TSH 2.28  -orthostatics negative   -S/p posterior head laceration stapling in ED. Will need to return to have staples removed in about 10 days.

## 2019-02-14 NOTE — PROGRESS NOTE ADULT - ASSESSMENT
Pt is a 58 year old man with PMHx of HTN, presenting after fall down stairs at home with some head trauma and LOC after feeling dizzy in the setting of restarting his atenolol 24 hours prior, found to be bradycardic in the ED.    #Syncope: Bradycardic to 30s in the ED, now sinus rythm 50-70's.  -pt has been asymptomatic since admission  -Hold any AVN blocking agent  -TTE today  -c/w Telemetry monitoring Pt is a 58 year old man with PMHx of HTN, presenting after fall down stairs at home with some head trauma and LOC after feeling dizzy in the setting of restarting his atenolol 24 hours prior, found to be bradycardic in the ED.    #Syncope: Bradycardic to 30s in the ED, now sinus rythm 50-70's, occasional bradycardia during sleep.  -pt has been asymptomatic since admission  -Hold any AVN blocking agent  -TTE today  -c/w Telemetry monitoring

## 2019-02-14 NOTE — PROGRESS NOTE ADULT - SUBJECTIVE AND OBJECTIVE BOX
Patient is a 58y old  Male who presents with a chief complaint of S/p fall with dizziness (14 Feb 2019 07:39)      SUBJECTIVE / OVERNIGHT EVENTS: Sinus 60-70s on tele, no cp, sob, dizziness, n/v    MEDICATIONS  (STANDING):  cephalexin 500 milliGRAM(s) Oral every 12 hours  lactobacillus acidophilus 1 Tablet(s) Oral three times a day with meals  magnesium sulfate  IVPB 1 Gram(s) IV Intermittent once  potassium chloride    Tablet ER 40 milliEquivalent(s) Oral once    MEDICATIONS  (PRN):  acetaminophen   Tablet .. 650 milliGRAM(s) Oral every 6 hours PRN Mild Pain (1 - 3), Moderate Pain (4 - 6)        CAPILLARY BLOOD GLUCOSE        I&O's Summary    13 Feb 2019 07:01  -  14 Feb 2019 07:00  --------------------------------------------------------  IN: 1280 mL / OUT: 300 mL / NET: 980 mL    14 Feb 2019 07:01  -  14 Feb 2019 14:22  --------------------------------------------------------  IN: 240 mL / OUT: 0 mL / NET: 240 mL        PHYSICAL EXAM:  GENERAL: NAD, well-developed  HEAD:  Atraumatic, Normocephalic  EYES:  conjunctiva and sclera clear  NECK:  No JVD  CHEST/LUNG: Clear to auscultation bilaterally; No wheeze  HEART: Regular rate and rhythm; No murmurs, rubs, or gallops  ABDOMEN: Soft, Nontender, Nondistended; Bowel sounds present, mild erythema over umbilical region   EXTREMITIES:  2+ Peripheral Pulses, No clubbing, cyanosis, or edema  PSYCH: AAOx3  NEUROLOGY: non-focal  SKIN: No rashes or lesions    LABS:                        17.2   6.3   )-----------( 194      ( 13 Feb 2019 07:17 )             51.0     02-14    138  |  102  |  13  ----------------------------<  96  3.5   |  24  |  0.88    Ca    9.0      14 Feb 2019 07:19  Phos  2.6     02-13  Mg     1.9     02-14                RADIOLOGY & ADDITIONAL TESTS:    Imaging Personally Reviewed:    Consultant(s) Notes Reviewed:  cardio    Care Discussed with Consultants/Other Providers:

## 2019-02-14 NOTE — PROGRESS NOTE ADULT - PROBLEM SELECTOR PLAN 4
-Patient has small area of folliculitis/early cellulitic changes above his navel.   -Will treat with 5 day course of Keflex.   -Advised him to follow up with PMD next week and also keep the area clean.

## 2019-02-14 NOTE — PROGRESS NOTE ADULT - SUBJECTIVE AND OBJECTIVE BOX
Patient seen and examined at bedside.    Overnight Events: No acute overnight events. Pt walked around yesterday, felt well and was asymptomatic. Pt is sinus rythm 50-70's on telemetry. Will get TTE today.    Review Of Systems: No chest pain, shortness of breath, or palpitations            Medications:  acetaminophen   Tablet .. 650 milliGRAM(s) Oral every 6 hours PRN  cephalexin 500 milliGRAM(s) Oral four times a day  sodium chloride 0.9%. 1000 milliLiter(s) IV Continuous <Continuous>      PAST MEDICAL & SURGICAL HISTORY:  Essential hypertension  No significant past surgical history      Vitals:  T(F): 97.4 (02-14), Max: 98.2 (02-13)  HR: 57 (02-14) (57 - 82)  BP: 137/89 (02-14) (131/83 - 137/89)  RR: 18 (02-14)  SpO2: 96% (02-14)  I&O's Summary    13 Feb 2019 07:01  -  14 Feb 2019 07:00  --------------------------------------------------------  IN: 1160 mL / OUT: 300 mL / NET: 860 mL        Physical Exam:  Appearance: No acute distress; well appearing  Eyes: PERRL, EOMI, pink conjunctiva  HENT:  posterior head laceration.  Cardiovascular: RRR, S1, S2, no murmurs, rubs, or gallops; no edema; no JVD  Respiratory: Clear to auscultation bilaterally  Gastrointestinal: soft, non-tender, non-distended with normal bowel sounds  Musculoskeletal: No clubbing; no joint deformity   Neurologic: Non-focal  Lymphatic: No lymphadenopathy  Psychiatry: AAOx3, mood & affect appropriate  Skin: No rashes, ecchymoses, or cyanosis                        17.2   6.3   )-----------( 194      ( 13 Feb 2019 07:17 )             51.0     02-13    139  |  102  |  14  ----------------------------<  77  3.3<L>   |  22  |  0.95    Ca    9.4      13 Feb 2019 07:15  Phos  2.6     02-13  Mg     2.0     02-13          Interpretation of Telemetry:  Sinus rythm 50-70's Patient seen and examined at bedside.    Overnight Events: No acute overnight events. Pt walked around yesterday, felt well and was asymptomatic. Pt is sinus rythm 50-70's on telemetry. Will get TTE today.    Review Of Systems: No chest pain, shortness of breath, or palpitations            Medications:  acetaminophen   Tablet .. 650 milliGRAM(s) Oral every 6 hours PRN  cephalexin 500 milliGRAM(s) Oral four times a day  sodium chloride 0.9%. 1000 milliLiter(s) IV Continuous <Continuous>      PAST MEDICAL & SURGICAL HISTORY:  Essential hypertension  No significant past surgical history      Vitals:  T(F): 97.4 (02-14), Max: 98.2 (02-13)  HR: 57 (02-14) (57 - 82)  BP: 137/89 (02-14) (131/83 - 137/89)  RR: 18 (02-14)  SpO2: 96% (02-14)  I&O's Summary    13 Feb 2019 07:01  -  14 Feb 2019 07:00  --------------------------------------------------------  IN: 1160 mL / OUT: 300 mL / NET: 860 mL        Physical Exam:  Appearance: No acute distress; well appearing  Eyes: PERRL, EOMI, pink conjunctiva  HENT:  posterior head laceration.  Cardiovascular: RRR, S1, S2, no murmurs, rubs, or gallops; no edema; no JVD  Respiratory: Clear to auscultation bilaterally  Gastrointestinal: soft, non-tender, non-distended with normal bowel sounds  Musculoskeletal: No clubbing; no joint deformity   Neurologic: Non-focal  Lymphatic: No lymphadenopathy  Psychiatry: AAOx3, mood & affect appropriate  Skin: No rashes, ecchymoses, or cyanosis                        17.2   6.3   )-----------( 194      ( 13 Feb 2019 07:17 )             51.0     02-13    139  |  102  |  14  ----------------------------<  77  3.3<L>   |  22  |  0.95    Ca    9.4      13 Feb 2019 07:15  Phos  2.6     02-13  Mg     2.0     02-13      Interpretation of Telemetry:  Sinus rythm 50-70's

## 2019-02-14 NOTE — PROGRESS NOTE ADULT - PROBLEM SELECTOR PLAN 2
-2/2 atenolol use   -Likely bradycardia and syncope due to excessive beta-blocker.   -Trops negative, no ischemic changes seen on EKG.   -CT head negative for any acute findings.   -C/w telemetry.   -echo with normal lv/rv function   -TSH 2.28  -orthostatics negative

## 2019-02-15 ENCOUNTER — TRANSCRIPTION ENCOUNTER (OUTPATIENT)
Age: 59
End: 2019-02-15

## 2019-02-15 VITALS
SYSTOLIC BLOOD PRESSURE: 133 MMHG | RESPIRATION RATE: 18 BRPM | OXYGEN SATURATION: 98 % | DIASTOLIC BLOOD PRESSURE: 80 MMHG | TEMPERATURE: 98 F | HEART RATE: 70 BPM

## 2019-02-15 LAB
ANION GAP SERPL CALC-SCNC: 12 MMOL/L — SIGNIFICANT CHANGE UP (ref 5–17)
BUN SERPL-MCNC: 15 MG/DL — SIGNIFICANT CHANGE UP (ref 7–23)
CALCIUM SERPL-MCNC: 8.9 MG/DL — SIGNIFICANT CHANGE UP (ref 8.4–10.5)
CHLORIDE SERPL-SCNC: 102 MMOL/L — SIGNIFICANT CHANGE UP (ref 96–108)
CO2 SERPL-SCNC: 23 MMOL/L — SIGNIFICANT CHANGE UP (ref 22–31)
CREAT SERPL-MCNC: 0.92 MG/DL — SIGNIFICANT CHANGE UP (ref 0.5–1.3)
GLUCOSE SERPL-MCNC: 92 MG/DL — SIGNIFICANT CHANGE UP (ref 70–99)
MAGNESIUM SERPL-MCNC: 2.1 MG/DL — SIGNIFICANT CHANGE UP (ref 1.6–2.6)
PHOSPHATE SERPL-MCNC: 3.6 MG/DL — SIGNIFICANT CHANGE UP (ref 2.5–4.5)
POTASSIUM SERPL-MCNC: 3.8 MMOL/L — SIGNIFICANT CHANGE UP (ref 3.5–5.3)
POTASSIUM SERPL-SCNC: 3.8 MMOL/L — SIGNIFICANT CHANGE UP (ref 3.5–5.3)
SODIUM SERPL-SCNC: 137 MMOL/L — SIGNIFICANT CHANGE UP (ref 135–145)

## 2019-02-15 PROCEDURE — 86850 RBC ANTIBODY SCREEN: CPT

## 2019-02-15 PROCEDURE — 93005 ELECTROCARDIOGRAM TRACING: CPT

## 2019-02-15 PROCEDURE — 99239 HOSP IP/OBS DSCHRG MGMT >30: CPT

## 2019-02-15 PROCEDURE — 81001 URINALYSIS AUTO W/SCOPE: CPT

## 2019-02-15 PROCEDURE — 99232 SBSQ HOSP IP/OBS MODERATE 35: CPT | Mod: GC

## 2019-02-15 PROCEDURE — 84484 ASSAY OF TROPONIN QUANT: CPT

## 2019-02-15 PROCEDURE — 84100 ASSAY OF PHOSPHORUS: CPT

## 2019-02-15 PROCEDURE — 72170 X-RAY EXAM OF PELVIS: CPT

## 2019-02-15 PROCEDURE — 80048 BASIC METABOLIC PNL TOTAL CA: CPT

## 2019-02-15 PROCEDURE — 71045 X-RAY EXAM CHEST 1 VIEW: CPT

## 2019-02-15 PROCEDURE — 82962 GLUCOSE BLOOD TEST: CPT

## 2019-02-15 PROCEDURE — 86901 BLOOD TYPING SEROLOGIC RH(D): CPT

## 2019-02-15 PROCEDURE — 72125 CT NECK SPINE W/O DYE: CPT

## 2019-02-15 PROCEDURE — 84443 ASSAY THYROID STIM HORMONE: CPT

## 2019-02-15 PROCEDURE — 80053 COMPREHEN METABOLIC PANEL: CPT

## 2019-02-15 PROCEDURE — 97162 PT EVAL MOD COMPLEX 30 MIN: CPT

## 2019-02-15 PROCEDURE — 93306 TTE W/DOPPLER COMPLETE: CPT

## 2019-02-15 PROCEDURE — 86900 BLOOD TYPING SEROLOGIC ABO: CPT

## 2019-02-15 PROCEDURE — 12002 RPR S/N/AX/GEN/TRNK2.6-7.5CM: CPT

## 2019-02-15 PROCEDURE — 83735 ASSAY OF MAGNESIUM: CPT

## 2019-02-15 PROCEDURE — 85027 COMPLETE CBC AUTOMATED: CPT

## 2019-02-15 PROCEDURE — 90471 IMMUNIZATION ADMIN: CPT

## 2019-02-15 PROCEDURE — 99285 EMERGENCY DEPT VISIT HI MDM: CPT | Mod: 25

## 2019-02-15 PROCEDURE — 85730 THROMBOPLASTIN TIME PARTIAL: CPT

## 2019-02-15 PROCEDURE — 80307 DRUG TEST PRSMV CHEM ANLYZR: CPT

## 2019-02-15 PROCEDURE — 70450 CT HEAD/BRAIN W/O DYE: CPT

## 2019-02-15 PROCEDURE — 85610 PROTHROMBIN TIME: CPT

## 2019-02-15 PROCEDURE — 90715 TDAP VACCINE 7 YRS/> IM: CPT

## 2019-02-15 RX ORDER — CEPHALEXIN 500 MG
1 CAPSULE ORAL
Qty: 5 | Refills: 0
Start: 2019-02-15 | End: 2019-02-17

## 2019-02-15 RX ORDER — POTASSIUM CHLORIDE 20 MEQ
20 PACKET (EA) ORAL ONCE
Qty: 0 | Refills: 0 | Status: COMPLETED | OUTPATIENT
Start: 2019-02-15 | End: 2019-02-15

## 2019-02-15 RX ORDER — ATENOLOL AND CHLORTHALIDONE 50; 25 MG/1; MG/1
1 TABLET ORAL
Qty: 0 | Refills: 0 | COMMUNITY

## 2019-02-15 RX ADMIN — Medication 1 TABLET(S): at 09:46

## 2019-02-15 RX ADMIN — Medication 500 MILLIGRAM(S): at 05:23

## 2019-02-15 RX ADMIN — Medication 1 TABLET(S): at 12:55

## 2019-02-15 RX ADMIN — Medication 20 MILLIEQUIVALENT(S): at 11:50

## 2019-02-15 NOTE — PHYSICAL THERAPY INITIAL EVALUATION ADULT - PRECAUTIONS/LIMITATIONS, REHAB EVAL
pertinent history continued... pertinent history continued... Pt found to be bradycardic to 30s in the ED in the setting of restarting his atenolol.   S/P GARY 2/14: LVEF 60-65%. Mild MR, mild-mod TR. pertinent history continued... Pt found to be bradycardic to 30s in the ED in the setting of restarting his atenolol.   S/P GARY 2/14: LVEF 60-65%. Mild MR, mild-mod TR./cardiac precautions

## 2019-02-15 NOTE — PHYSICAL THERAPY INITIAL EVALUATION ADULT - LIVES WITH, PROFILE
Pt lives with spouse and son in a private house. Pt reports 6 stairs to enter with bilateral railings, bedroom/bathroom on main floor of house.

## 2019-02-15 NOTE — PROGRESS NOTE ADULT - SUBJECTIVE AND OBJECTIVE BOX
Patient seen and examined at bedside.    Overnight Events: No acute overnight events.     Review Of Systems: No chest pain, shortness of breath, or palpitations            Medications:  acetaminophen   Tablet .. 650 milliGRAM(s) Oral every 6 hours PRN  cephalexin 500 milliGRAM(s) Oral every 12 hours  lactobacillus acidophilus 1 Tablet(s) Oral three times a day with meals  potassium chloride    Tablet ER 20 milliEquivalent(s) Oral once      PAST MEDICAL & SURGICAL HISTORY:  Essential hypertension  No significant past surgical history      Vitals:  T(F): 98.1 (02-15), Max: 98.1 (02-15)  HR: 52 (02-15) (52 - 73)  BP: 122/74 (02-15) (122/74 - 146/86)  RR: 18 (02-15)  SpO2: 99% (02-15)  I&O's Summary    14 Feb 2019 07:01  -  15 Feb 2019 07:00  --------------------------------------------------------  IN: 840 mL / OUT: 0 mL / NET: 840 mL        Physical Exam:  Appearance: No acute distress; well appearing  Eyes: PERRL, EOMI, pink conjunctiva  HEENT:  posterior head laceration.  Cardiovascular: RRR, S1, S2, no murmurs, rubs, or gallops; no edema; no JVD  Respiratory: Clear to auscultation bilaterally  Gastrointestinal: soft, non-tender, non-distended with normal bowel sounds  Musculoskeletal: No clubbing; no joint deformity   Neurologic: Non-focal  Lymphatic: No lymphadenopathy  Psychiatry: AAOx3, mood & affect appropriate  Skin: No rashes, ecchymoses, or cyanosis      02-15    137  |  102  |  15  ----------------------------<  92  3.8   |  23  |  0.92    Ca    8.9      15 Feb 2019 06:03  Phos  3.6     02-15  Mg     2.1     02-15        Interpretation of Telemetry: sinus rythm HR 50-70s    Imaging:  EF (Visual Estimate): 60-65 %  ------------------------------------------------------------------------  Observations:  Mitral Valve: Mild mitral annular calcification, otherwise  normal mitral valve. Minimal mitral regurgitation.  Aortic Valve/Aorta: Mildly calcified trileaflet aortic  valve with normal opening. No aortic valve regurgitation  seen.  Aortic Root: 3.7 cm.  Left Atrium: Normal left atrium.  LA volume index = 29  cc/m2. Mobile interatrial septum.  Left Ventricle: Normal left ventricular systolic function.  No segmental wall motion abnormalities. Normal left  ventricular internal dimensions and wall thicknesses. Basal  septal bulge. Basal septal thickness=1.6 cm.  Right Heart: Normal right atrium. Normal right ventricular  size and function. Normal tricuspid valve. Mild-moderate  tricuspid regurgitation. Normal pulmonic valve. Minimal  pulmonic regurgitation.  Pericardium/Pleura: Normal pericardium with no pericardial  effusion.  Hemodynamic: Estimated right ventricular systolic pressure  equals 30 mm Hg, assuming right atrial pressure equals 3 mm  Hg, consistent with normal pulmonary pressures.  ------------------------------------------------------------------------  Conclusions:  1. Mild mitral annular calcification, otherwise normal  mitral valve. Minimal mitral regurgitation.  2. Mildly calcified trileaflet aortic valve with normal  opening. No aortic valve regurgitation seen.  3. Normal left ventricular systolic function. No segmental  wall motion abnormalities.  4. Normal right ventricular size and function.  *** No previous Echo exam. Patient seen and examined at bedside.    Overnight Events: No acute overnight events. Pt with transient bradycardia to the 30's during sleep, but asymptomatic and BP remains stable.    Review Of Systems: No chest pain, shortness of breath, or palpitations            Medications:  acetaminophen   Tablet .. 650 milliGRAM(s) Oral every 6 hours PRN  cephalexin 500 milliGRAM(s) Oral every 12 hours  lactobacillus acidophilus 1 Tablet(s) Oral three times a day with meals  potassium chloride    Tablet ER 20 milliEquivalent(s) Oral once      PAST MEDICAL & SURGICAL HISTORY:  Essential hypertension  No significant past surgical history      Vitals:  T(F): 98.1 (02-15), Max: 98.1 (02-15)  HR: 52 (02-15) (52 - 73)  BP: 122/74 (02-15) (122/74 - 146/86)  RR: 18 (02-15)  SpO2: 99% (02-15)  I&O's Summary    14 Feb 2019 07:01  -  15 Feb 2019 07:00  --------------------------------------------------------  IN: 840 mL / OUT: 0 mL / NET: 840 mL    Physical Exam:  Appearance: No acute distress; well appearing  Eyes: PERRL, EOMI, pink conjunctiva  HEENT:  posterior head laceration.  Cardiovascular: RRR, S1, S2, no murmurs, rubs, or gallops; no edema; no JVD  Respiratory: Clear to auscultation bilaterally  Gastrointestinal: soft, non-tender, non-distended with normal bowel sounds  Musculoskeletal: No clubbing; no joint deformity   Neurologic: Non-focal  Lymphatic: No lymphadenopathy  Psychiatry: AAOx3, mood & affect appropriate  Skin: No rashes, ecchymoses, or cyanosis    02-15    137  |  102  |  15  ----------------------------<  92  3.8   |  23  |  0.92    Ca    8.9      15 Feb 2019 06:03  Phos  3.6     02-15  Mg     2.1     02-15        Interpretation of Telemetry: sinus rythm HR 50-70s with transient bradycardia to the 30's during sleep.    Imaging:  EF (Visual Estimate): 60-65 %  ------------------------------------------------------------------------  Observations:  Mitral Valve: Mild mitral annular calcification, otherwise  normal mitral valve. Minimal mitral regurgitation.  Aortic Valve/Aorta: Mildly calcified trileaflet aortic  valve with normal opening. No aortic valve regurgitation  seen.  Aortic Root: 3.7 cm.  Left Atrium: Normal left atrium.  LA volume index = 29  cc/m2. Mobile interatrial septum.  Left Ventricle: Normal left ventricular systolic function.  No segmental wall motion abnormalities. Normal left  ventricular internal dimensions and wall thicknesses. Basal  septal bulge. Basal septal thickness=1.6 cm.  Right Heart: Normal right atrium. Normal right ventricular  size and function. Normal tricuspid valve. Mild-moderate  tricuspid regurgitation. Normal pulmonic valve. Minimal  pulmonic regurgitation.  Pericardium/Pleura: Normal pericardium with no pericardial  effusion.  Hemodynamic: Estimated right ventricular systolic pressure  equals 30 mm Hg, assuming right atrial pressure equals 3 mm  Hg, consistent with normal pulmonary pressures.  ------------------------------------------------------------------------  Conclusions:  1. Mild mitral annular calcification, otherwise normal  mitral valve. Minimal mitral regurgitation.  2. Mildly calcified trileaflet aortic valve with normal  opening. No aortic valve regurgitation seen.  3. Normal left ventricular systolic function. No segmental  wall motion abnormalities.  4. Normal right ventricular size and function.  *** No previous Echo exam. Patient seen and examined at bedside.    Overnight Events: No acute overnight events. Pt with transient bradycardia to the 30's during sleep, but asymptomatic and BP remains stable.    Review Of Systems: No chest pain, shortness of breath, or palpitations    Medications:  acetaminophen   Tablet .. 650 milliGRAM(s) Oral every 6 hours PRN  cephalexin 500 milliGRAM(s) Oral every 12 hours  lactobacillus acidophilus 1 Tablet(s) Oral three times a day with meals  potassium chloride    Tablet ER 20 milliEquivalent(s) Oral once      PAST MEDICAL & SURGICAL HISTORY:  Essential hypertension  No significant past surgical history      Vitals:  T(F): 98.1 (02-15), Max: 98.1 (02-15)  HR: 52 (02-15) (52 - 73)  BP: 122/74 (02-15) (122/74 - 146/86)  RR: 18 (02-15)  SpO2: 99% (02-15)  I&O's Summary    14 Feb 2019 07:01  -  15 Feb 2019 07:00  --------------------------------------------------------  IN: 840 mL / OUT: 0 mL / NET: 840 mL    Physical Exam:  Appearance: No acute distress; well appearing  Eyes: PERRL, EOMI, pink conjunctiva  HEENT:  posterior head laceration.  Cardiovascular: RRR, S1, S2, no murmurs, rubs, or gallops; no edema; no JVD  Respiratory: Clear to auscultation bilaterally  Gastrointestinal: soft, non-tender, non-distended with normal bowel sounds  Musculoskeletal: No clubbing; no joint deformity   Neurologic: Non-focal  Lymphatic: No lymphadenopathy  Psychiatry: AAOx3, mood & affect appropriate  Skin: No rashes, ecchymoses, or cyanosis    02-15    137  |  102  |  15  ----------------------------<  92  3.8   |  23  |  0.92    Ca    8.9      15 Feb 2019 06:03  Phos  3.6     02-15  Mg     2.1     02-15        Interpretation of Telemetry: sinus rythm HR 50-70s with transient bradycardia to the 30's during sleep.    Imaging:  EF (Visual Estimate): 60-65 %  ------------------------------------------------------------------------  Observations:  Mitral Valve: Mild mitral annular calcification, otherwise  normal mitral valve. Minimal mitral regurgitation.  Aortic Valve/Aorta: Mildly calcified trileaflet aortic  valve with normal opening. No aortic valve regurgitation  seen.  Aortic Root: 3.7 cm.  Left Atrium: Normal left atrium.  LA volume index = 29  cc/m2. Mobile interatrial septum.  Left Ventricle: Normal left ventricular systolic function.  No segmental wall motion abnormalities. Normal left  ventricular internal dimensions and wall thicknesses. Basal  septal bulge. Basal septal thickness=1.6 cm.  Right Heart: Normal right atrium. Normal right ventricular  size and function. Normal tricuspid valve. Mild-moderate  tricuspid regurgitation. Normal pulmonic valve. Minimal  pulmonic regurgitation.  Pericardium/Pleura: Normal pericardium with no pericardial  effusion.  Hemodynamic: Estimated right ventricular systolic pressure  equals 30 mm Hg, assuming right atrial pressure equals 3 mm  Hg, consistent with normal pulmonary pressures.  ------------------------------------------------------------------------  Conclusions:  1. Mild mitral annular calcification, otherwise normal  mitral valve. Minimal mitral regurgitation.  2. Mildly calcified trileaflet aortic valve with normal  opening. No aortic valve regurgitation seen.  3. Normal left ventricular systolic function. No segmental  wall motion abnormalities.  4. Normal right ventricular size and function.  *** No previous Echo exam.

## 2019-02-15 NOTE — PROGRESS NOTE ADULT - NSHPATTENDINGPLANDISCUSS_GEN_ALL_CORE
To reach Cardiology Attending call during weekdays Spectra [phone] or Fellow [phone].
To reach Cardiology Attending call during weekdays Spectra [phone] or Fellow [phone].
To reach Cardiology Attending call during weekdays Spectra [phone] or Fellow [phone].

## 2019-02-15 NOTE — PHYSICAL THERAPY INITIAL EVALUATION ADULT - DIAGNOSIS, PT EVAL
57 y/o male admitted s/p syncopal episode presents at functional baseline. Pt independent with all functional activities, no skilled PT needs indicated upon d/c.

## 2019-02-15 NOTE — DISCHARGE NOTE ADULT - HOSPITAL COURSE
58 year old female with PMH of HTN; presented after fall down stairs at home with some head trauma and LOC after feeling dizzy. Patient restarted taking his BP meds (atenolol-chlorthalidone) about 3 days ago after not taking for about 8 months or so.  Admitted for symptomatic bradycardia, likely due to atenolol side effect.   Symptomatic bradycardia. S/p fall at home after becoming dizzy in setting of recently resuming atenolol-chlorthalidone on his own as an outpatient. He says he was taking ?BID atenolol 100mg, which is a high dose.Likely bradycardia and dizziness/fall due to excessive beta-blocker. Trops negative, no ischemic changes seen on EKG. CT head negative for any acute findings. Hold beta-blocker/ av chepe blocking agents, C/w telemetry. echo with normal lv/rv function, TSH 2.28, orthostatics negative. S/p posterior head laceration stapling in ED. Will need to return to have staples removed in about 10 days.   Syncope and collapse.  Plan: -2/2 atenolol use, likely bradycardia and syncope due to excessive beta-blocker.   Folliculitis. Patient has small area of folliculitis/early cellulitic changes above his navel. Will treat with 5 day course of Keflex. Advised him to follow up with PMD next week and also keep the area clean.   Stable for discharge

## 2019-02-15 NOTE — PHYSICAL THERAPY INITIAL EVALUATION ADULT - PERTINENT HX OF CURRENT PROBLEM, REHAB EVAL
Pt is a 57 y/o F with PMH of HTN; presented after fall down stairs at home with some head trauma and LOC after feeling dizzy. Patient restarted taking his BP meds (atenolol-chlorthalidone) about 3 days ago after not taking for about 8 months or so. He took them on his own, he did not see or speak with his physician. In ED he had a posterior head laceration s/p staples. He denies CP, SOB, N/V, diarrhea, cough, fevers, dysuria, HA, weakness, or vision changes.  Continued...

## 2019-02-15 NOTE — DISCHARGE NOTE ADULT - MEDICATION SUMMARY - MEDICATIONS TO STOP TAKING
I will STOP taking the medications listed below when I get home from the hospital:    atenolol-chlorthalidone 100 mg-25 mg oral tablet  -- 1 tab(s) by mouth 2 times a day

## 2019-02-15 NOTE — DISCHARGE NOTE ADULT - MEDICATION SUMMARY - MEDICATIONS TO TAKE
I will START or STAY ON the medications listed below when I get home from the hospital:    cephalexin 500 mg oral capsule  -- 1 cap(s) by mouth every 12 hours  -- Indication: For Folliculitis

## 2019-02-15 NOTE — PROGRESS NOTE ADULT - REASON FOR ADMISSION
S/p fall with dizziness

## 2019-02-15 NOTE — DISCHARGE NOTE ADULT - PLAN OF CARE
no syncope beta blocker stopped  follow up with medical doctor in 1 week  return tot hospital if dizzy, chest pain, difficulty breathing, intractable headache or worsens plan as above take antibiotics for 3 more days and follow up with your physician five staples placed  see doctor 2/19 for staple removal  you received a Tetanus vaccine here

## 2019-02-15 NOTE — DISCHARGE NOTE ADULT - CARE PLAN
Principal Discharge DX:	Syncope and collapse  Goal:	no syncope  Assessment and plan of treatment:	beta blocker stopped  follow up with medical doctor in 1 week  return tot hospital if dizzy, chest pain, difficulty breathing, intractable headache or worsens  Secondary Diagnosis:	Symptomatic bradycardia  Assessment and plan of treatment:	plan as above  Secondary Diagnosis:	Folliculitis  Assessment and plan of treatment:	take antibiotics for 3 more days and follow up with your physician  Secondary Diagnosis:	Scalp laceration, initial encounter  Assessment and plan of treatment:	five staples placed  see doctor 2/19 for staple removal  you received a Tetanus vaccine here

## 2019-02-15 NOTE — PROGRESS NOTE ADULT - SUBJECTIVE AND OBJECTIVE BOX
Patient is a 58y old  Male who presents with a chief complaint of S/p fall with dizziness (15 Feb 2019 09:30)      SUBJECTIVE / OVERNIGHT EVENTS: sinus 50-70s on tele, no cp, sob, chills, weakness, dizziness    MEDICATIONS  (STANDING):  cephalexin 500 milliGRAM(s) Oral every 12 hours  lactobacillus acidophilus 1 Tablet(s) Oral three times a day with meals    MEDICATIONS  (PRN):  acetaminophen   Tablet .. 650 milliGRAM(s) Oral every 6 hours PRN Mild Pain (1 - 3), Moderate Pain (4 - 6)        CAPILLARY BLOOD GLUCOSE        I&O's Summary    14 Feb 2019 07:01  -  15 Feb 2019 07:00  --------------------------------------------------------  IN: 840 mL / OUT: 0 mL / NET: 840 mL        PHYSICAL EXAM:  GENERAL: NAD, well-developed  HEAD:  Atraumatic, Normocephalic  EYES: EOMI, PERRLA, conjunctiva and sclera clear  NECK: Supple, No JVD  CHEST/LUNG: Clear to auscultation bilaterally; No wheeze  HEART: Regular rate and rhythm; No murmurs, rubs, or gallops  ABDOMEN: Soft, Nontender, Nondistended; Bowel sounds present  EXTREMITIES:  2+ Peripheral Pulses, No clubbing, cyanosis, or edema  PSYCH: AAOx3  NEUROLOGY: non-focal  SKIN: No rashes or lesions    LABS:    02-15    137  |  102  |  15  ----------------------------<  92  3.8   |  23  |  0.92    Ca    8.9      15 Feb 2019 06:03  Phos  3.6     02-15  Mg     2.1     02-15                RADIOLOGY & ADDITIONAL TESTS:    Imaging Personally Reviewed:    Consultant(s) Notes Reviewed:  cardio    Care Discussed with Consultants/Other Providers:

## 2019-02-15 NOTE — PROGRESS NOTE ADULT - ASSESSMENT
Pt is a 58 year old man with PMHx of HTN, presenting after fall down stairs at home with some head trauma and LOC after feeling dizzy, found to be bradycardic in the ED.    #Syncope: Bradycardic to 30s in the ED in the setting of restarting his atenolol. Denies chest pain, shortness of breath. No previous hx of syncope.   -Hold any AVN blocking agent  -TTE with LVEF 60-65%, mild MR, mid-mod TR.   -Can be discharged from a cardiology perspective, with out-patient cardiology follow up . Pt is a 58 year old man with PMHx of HTN, presenting after fall down stairs at home with some head trauma and LOC after feeling dizzy, found to be bradycardic in the ED.    #Syncope: Bradycardic to 30s in the ED in the setting of restarting his atenolol. Now sinus 50-70's with transient bradycardia to the 30's during sleep. Denies chest pain, shortness of breath. No previous hx of syncope.   -Hold any AVN blocking agent  -TTE with LVEF 60-65%, mild MR, mid-mod TR.   -Can be discharged from a cardiology perspective, with out-patient cardiology follow up .

## 2019-02-15 NOTE — DISCHARGE NOTE ADULT - PROVIDER TOKENS
FREE:[LAST:[medical clinic],PHONE:[phone]],FAX:[(   )    -]],FREE:[LAST:[cardiology clinic],PHONE:[phone]],FAX:[(   )    -]]

## 2019-02-15 NOTE — DISCHARGE NOTE ADULT - CARE PROVIDER_API CALL
medical clinic,   Phone: [phone]  Fax: (   )    -  Follow Up Time:     cardiology clinic,   Phone: [phone]  Fax: (   )    -  Follow Up Time:

## 2019-02-15 NOTE — DISCHARGE NOTE ADULT - PATIENT PORTAL LINK FT
You can access the [facility] Patient Portal, offered by [facility], by registering with the following website: [email or web address]

## 2019-02-19 PROBLEM — I10 ESSENTIAL (PRIMARY) HYPERTENSION: Chronic | Status: ACTIVE | Noted: 2019-02-12

## 2019-02-21 ENCOUNTER — APPOINTMENT (OUTPATIENT)
Dept: INTERNAL MEDICINE | Facility: CLINIC | Age: 59
End: 2019-02-21
Payer: COMMERCIAL

## 2019-02-21 VITALS
DIASTOLIC BLOOD PRESSURE: 70 MMHG | OXYGEN SATURATION: 98 % | SYSTOLIC BLOOD PRESSURE: 126 MMHG | HEART RATE: 85 BPM | WEIGHT: 188 LBS | BODY MASS INDEX: 26.32 KG/M2 | HEIGHT: 71 IN

## 2019-02-21 VITALS — SYSTOLIC BLOOD PRESSURE: 140 MMHG | DIASTOLIC BLOOD PRESSURE: 92 MMHG

## 2019-02-21 VITALS — DIASTOLIC BLOOD PRESSURE: 85 MMHG | SYSTOLIC BLOOD PRESSURE: 138 MMHG

## 2019-02-21 DIAGNOSIS — Z00.00 ENCOUNTER FOR GENERAL ADULT MEDICAL EXAMINATION W/OUT ABNORMAL FINDINGS: ICD-10-CM

## 2019-02-21 DIAGNOSIS — Z78.9 OTHER SPECIFIED HEALTH STATUS: ICD-10-CM

## 2019-02-21 DIAGNOSIS — Z86.79 PERSONAL HISTORY OF OTHER DISEASES OF THE CIRCULATORY SYSTEM: ICD-10-CM

## 2019-02-21 PROCEDURE — 99204 OFFICE O/P NEW MOD 45 MIN: CPT | Mod: 25

## 2019-02-21 PROCEDURE — 90674 CCIIV4 VAC NO PRSV 0.5 ML IM: CPT

## 2019-02-21 PROCEDURE — G0008: CPT

## 2019-02-21 NOTE — HEALTH RISK ASSESSMENT
[Any fall with injury in past year] : Patient reported fall with injury in the past year [] : No [de-identified] : none at all

## 2019-02-21 NOTE — PHYSICAL EXAM
[No Acute Distress] : no acute distress [Well-Appearing] : well-appearing [Normal Sclera/Conjunctiva] : normal sclera/conjunctiva [EOMI] : extraocular movements intact [Supple] : supple [No Lymphadenopathy] : no lymphadenopathy [No Respiratory Distress] : no respiratory distress  [Clear to Auscultation] : lungs were clear to auscultation bilaterally [Normal Rate] : normal rate  [Regular Rhythm] : with a regular rhythm [Normal S1, S2] : normal S1 and S2 [Soft] : abdomen soft [Non Tender] : non-tender [Normal Bowel Sounds] : normal bowel sounds [Normal Posterior Cervical Nodes] : no posterior cervical lymphadenopathy [Normal Anterior Cervical Nodes] : no anterior cervical lymphadenopathy [No CVA Tenderness] : no CVA  tenderness [No Spinal Tenderness] : no spinal tenderness [Grossly Normal Strength/Tone] : grossly normal strength/tone [No Rash] : no rash [Coordination Grossly Intact] : coordination grossly intact [No Focal Deficits] : no focal deficits [Deep Tendon Reflexes (DTR)] : deep tendon reflexes were 2+ and symmetric [de-identified] : scalp laceration clean, dry, without erythema or swelling.  [de-identified] : somewhat anxious

## 2019-02-21 NOTE — ASSESSMENT
[FreeTextEntry1] : Hospital discharge instructions indicate staples should be removed 10 days from date of discharge.\par He will return in four days to remove staples from scalp laceration. Laceration site currently clean, dry,  nonerythematous, and non tender.\par HCM-\par Flu shot today.\par Return for complete physical exam.

## 2019-02-21 NOTE — DATA REVIEWED
[FreeTextEntry1] : reviewed labs from recent hospitalization\par reviewed paperwork from hospitalization brought in by Mr CURTIS

## 2019-02-21 NOTE — HISTORY OF PRESENT ILLNESS
[FreeTextEntry8] : Here for f/u after recent hospitalization for head injury after fall and to [facility] care . Hosp from 2/12/19 - 2/12/15\par \par 58 year old female with PMH of HTN; presented after fall down stairs at home \par with some head trauma and LOC after feeling dizzy. Patient restarted taking his \par BP meds (atenolol-chlorthalidone) about 3 days ago after not taking for about 8 \par months or so.  Admitted for symptomatic bradycardia, likely due to atenolol \par side effect. \par Symptomatic bradycardia. S/p fall at home after becoming dizzy in setting of \par recently resuming atenolol-chlorthalidone on his own as an outpatient. He says \par he was taking ?BID atenolol 100mg, which is a high dose.Likely bradycardia and \par dizziness/fall due to excessive beta-blocker. Trops negative, no ischemic \par changes seen on EKG. CT head negative for any acute findings. Hold \par beta-blocker/ av chepe blocking agents, C/w telemetry. echo with normal lv/rv \par function, TSH 2.28, orthostatics negative. S/p posterior head laceration \par stapling in ED. Will need to return to have staples removed in about 10 days. \par Syncope and collapse.  Plan: -2/2 atenolol use, likely bradycardia and syncope \par due to excessive beta-blocker. \par Folliculitis. Patient has small area of folliculitis/early cellulitic changes \par above his navel. Will treat with 5 day course of Keflex. Advised him to follow \par up with PMD next week and also keep the area clean. \par Stable for discharge \par \par

## 2019-02-25 LAB
BASOPHILS # BLD AUTO: 0.03 K/UL
BASOPHILS NFR BLD AUTO: 0.6 %
EOSINOPHIL # BLD AUTO: 0.19 K/UL
EOSINOPHIL NFR BLD AUTO: 4.1 %
FERRITIN SERPL-MCNC: 313 NG/ML
HBA1C MFR BLD HPLC: 5.4 %
HCT VFR BLD CALC: 51.4 %
HGB BLD-MCNC: 16.8 G/DL
IMM GRANULOCYTES NFR BLD AUTO: 0 %
IRON SATN MFR SERPL: 26 %
IRON SERPL-MCNC: 77 UG/DL
LYMPHOCYTES # BLD AUTO: 1.94 K/UL
LYMPHOCYTES NFR BLD AUTO: 41.5 %
MAN DIFF?: NORMAL
MCHC RBC-ENTMCNC: 31.6 PG
MCHC RBC-ENTMCNC: 32.7 GM/DL
MCV RBC AUTO: 96.8 FL
MONOCYTES # BLD AUTO: 0.54 K/UL
MONOCYTES NFR BLD AUTO: 11.6 %
NEUTROPHILS # BLD AUTO: 1.97 K/UL
NEUTROPHILS NFR BLD AUTO: 42.2 %
PLATELET # BLD AUTO: 236 K/UL
RBC # BLD: 5.31 M/UL
RBC # FLD: 13.2 %
TIBC SERPL-MCNC: 296 UG/DL
UIBC SERPL-MCNC: 219 UG/DL
WBC # FLD AUTO: 4.67 K/UL

## 2019-02-26 ENCOUNTER — APPOINTMENT (OUTPATIENT)
Dept: INTERNAL MEDICINE | Facility: CLINIC | Age: 59
End: 2019-02-26
Payer: COMMERCIAL

## 2019-02-26 VITALS
DIASTOLIC BLOOD PRESSURE: 80 MMHG | WEIGHT: 189 LBS | SYSTOLIC BLOOD PRESSURE: 120 MMHG | HEART RATE: 60 BPM | BODY MASS INDEX: 26.46 KG/M2 | HEIGHT: 71 IN | OXYGEN SATURATION: 98 %

## 2019-02-26 DIAGNOSIS — R03.0 ELEVATED BLOOD-PRESSURE READING, W/OUT DIAGNOSIS OF HYPERTENSION: ICD-10-CM

## 2019-02-26 DIAGNOSIS — Z48.02 ENCOUNTER FOR REMOVAL OF SUTURES: ICD-10-CM

## 2019-02-26 DIAGNOSIS — D58.2 OTHER HEMOGLOBINOPATHIES: ICD-10-CM

## 2019-02-26 PROCEDURE — 99213 OFFICE O/P EST LOW 20 MIN: CPT

## 2019-02-27 PROBLEM — D58.2 ELEVATED HEMOGLOBIN: Status: ACTIVE | Noted: 2019-02-21

## 2019-02-27 PROBLEM — Z48.02: Status: ACTIVE | Noted: 2019-02-27

## 2019-02-27 PROBLEM — R03.0 BORDERLINE HYPERTENSION: Status: ACTIVE | Noted: 2019-02-21

## 2019-02-27 NOTE — PHYSICAL EXAM
[No Acute Distress] : no acute distress [Well-Appearing] : well-appearing [No Respiratory Distress] : no respiratory distress  [Clear to Auscultation] : lungs were clear to auscultation bilaterally [Normal Rate] : normal rate  [Regular Rhythm] : with a regular rhythm [Normal S1, S2] : normal S1 and S2 [No Edema] : there was no peripheral edema [Soft] : abdomen soft [Non Tender] : non-tender [No Joint Swelling] : no joint swelling [Grossly Normal Strength/Tone] : grossly normal strength/tone [No Rash] : no rash [de-identified] : incision clean dry and intact. no surrounding erythema or edema.

## 2019-02-27 NOTE — HISTORY OF PRESENT ILLNESS
[FreeTextEntry1] : here for BP check and to get staples removed [de-identified] : Doing well off BP medication. Trying to follow healthy diet. \par Scalp laceration healing well - no pain or inflammation.  Looking forward to getting the staples removed today.

## 2019-02-27 NOTE — REVIEW OF SYSTEMS
[Fever] : no fever [Chills] : no chills [Chest Pain] : no chest pain [Palpitations] : no palpitations [Negative] : Integumentary

## 2019-05-31 ENCOUNTER — APPOINTMENT (OUTPATIENT)
Dept: INTERNAL MEDICINE | Facility: CLINIC | Age: 59
End: 2019-05-31

## 2021-01-18 NOTE — PROGRESS NOTE ADULT - ATTENDING COMMENTS
58 year old man presents with traumatic syncope, observe sinus bradycardia, occurring within a day of resuming atenolol which he had stopped months earlier. Exam otherwise unremarkable. Need cardiac echo to evaluate LV function and monitoring on telemetry for arrhythmias, symptomatic bradycardia, heart block. Thus far telemetry without abnormal findings, sinus rhythm, transient slowing overnight during sleep to 37.
58 year old man presents with traumatic syncope, observe sinus bradycardia, occurring within a day of resuming atenolol which he had stopped months earlier. Exam otherwise unremarkable. Need cardiac echo to evaluate LV function and monitoring on telemetry for arrhythmias, symptomatic bradycardia, heart block. Thus far telemetry without abnormal findings, sinus rhythm, transient slowing overnight during sleep to 37.
58 year old man presents with traumatic syncope, observe sinus bradycardia, occurring within a day of resuming atenolol which he had stopped months earlier. Exam otherwise unremarkable. Need cardiac echo to evaluate LV function and monitoring on telemetry for arrhythmias, symptomatic bradycardia, heart block. Thus far telemetry without abnormal findings, sinus rhythm, transient slowing overnight during sleep to 37, but only for seconds with overall heart rate during minute in normal range. Cardiac echo normal. Requires no further cardiac evaluation of change in management.
left
Discharge time 50 min

## 2021-04-09 ENCOUNTER — INPATIENT (INPATIENT)
Facility: HOSPITAL | Age: 61
LOS: 2 days | Discharge: HOME | End: 2021-04-12
Attending: FAMILY MEDICINE | Admitting: FAMILY MEDICINE
Payer: COMMERCIAL

## 2021-04-09 VITALS
DIASTOLIC BLOOD PRESSURE: 80 MMHG | HEART RATE: 55 BPM | WEIGHT: 190.04 LBS | RESPIRATION RATE: 16 BRPM | TEMPERATURE: 98 F | OXYGEN SATURATION: 100 % | HEIGHT: 73 IN | SYSTOLIC BLOOD PRESSURE: 101 MMHG

## 2021-04-09 DIAGNOSIS — I10 ESSENTIAL (PRIMARY) HYPERTENSION: ICD-10-CM

## 2021-04-09 DIAGNOSIS — R55 SYNCOPE AND COLLAPSE: ICD-10-CM

## 2021-04-09 LAB
ALBUMIN SERPL ELPH-MCNC: 3.6 G/DL — SIGNIFICANT CHANGE UP (ref 3.3–5)
ALP SERPL-CCNC: 121 U/L — HIGH (ref 40–120)
ALT FLD-CCNC: 32 U/L — SIGNIFICANT CHANGE UP (ref 12–78)
ANION GAP SERPL CALC-SCNC: 2 MMOL/L — LOW (ref 5–17)
APTT BLD: 28.9 SEC — SIGNIFICANT CHANGE UP (ref 27.5–35.5)
AST SERPL-CCNC: 7 U/L — LOW (ref 15–37)
BASOPHILS # BLD AUTO: 0.03 K/UL — SIGNIFICANT CHANGE UP (ref 0–0.2)
BASOPHILS NFR BLD AUTO: 0.5 % — SIGNIFICANT CHANGE UP (ref 0–2)
BILIRUB SERPL-MCNC: 0.4 MG/DL — SIGNIFICANT CHANGE UP (ref 0.2–1.2)
BUN SERPL-MCNC: 16 MG/DL — SIGNIFICANT CHANGE UP (ref 7–23)
CALCIUM SERPL-MCNC: 8.6 MG/DL — SIGNIFICANT CHANGE UP (ref 8.5–10.1)
CHLORIDE SERPL-SCNC: 110 MMOL/L — HIGH (ref 96–108)
CO2 SERPL-SCNC: 31 MMOL/L — SIGNIFICANT CHANGE UP (ref 22–31)
CREAT SERPL-MCNC: 1.18 MG/DL — SIGNIFICANT CHANGE UP (ref 0.5–1.3)
EOSINOPHIL # BLD AUTO: 0.23 K/UL — SIGNIFICANT CHANGE UP (ref 0–0.5)
EOSINOPHIL NFR BLD AUTO: 3.7 % — SIGNIFICANT CHANGE UP (ref 0–6)
FLUAV AG NPH QL: SIGNIFICANT CHANGE UP
FLUBV AG NPH QL: SIGNIFICANT CHANGE UP
GLUCOSE SERPL-MCNC: 84 MG/DL — SIGNIFICANT CHANGE UP (ref 70–99)
HCT VFR BLD CALC: 47.7 % — SIGNIFICANT CHANGE UP (ref 39–50)
HGB BLD-MCNC: 16.3 G/DL — SIGNIFICANT CHANGE UP (ref 13–17)
IMM GRANULOCYTES NFR BLD AUTO: 0.2 % — SIGNIFICANT CHANGE UP (ref 0–1.5)
INR BLD: 1.12 RATIO — SIGNIFICANT CHANGE UP (ref 0.88–1.16)
LYMPHOCYTES # BLD AUTO: 1.77 K/UL — SIGNIFICANT CHANGE UP (ref 1–3.3)
LYMPHOCYTES # BLD AUTO: 28.6 % — SIGNIFICANT CHANGE UP (ref 13–44)
MCHC RBC-ENTMCNC: 30.9 PG — SIGNIFICANT CHANGE UP (ref 27–34)
MCHC RBC-ENTMCNC: 34.2 GM/DL — SIGNIFICANT CHANGE UP (ref 32–36)
MCV RBC AUTO: 90.5 FL — SIGNIFICANT CHANGE UP (ref 80–100)
MONOCYTES # BLD AUTO: 0.59 K/UL — SIGNIFICANT CHANGE UP (ref 0–0.9)
MONOCYTES NFR BLD AUTO: 9.5 % — SIGNIFICANT CHANGE UP (ref 2–14)
NEUTROPHILS # BLD AUTO: 3.55 K/UL — SIGNIFICANT CHANGE UP (ref 1.8–7.4)
NEUTROPHILS NFR BLD AUTO: 57.5 % — SIGNIFICANT CHANGE UP (ref 43–77)
NRBC # BLD: 0 /100 WBCS — SIGNIFICANT CHANGE UP (ref 0–0)
PLATELET # BLD AUTO: 230 K/UL — SIGNIFICANT CHANGE UP (ref 150–400)
POTASSIUM SERPL-MCNC: 4.1 MMOL/L — SIGNIFICANT CHANGE UP (ref 3.5–5.3)
POTASSIUM SERPL-SCNC: 4.1 MMOL/L — SIGNIFICANT CHANGE UP (ref 3.5–5.3)
PROT SERPL-MCNC: 7.7 GM/DL — SIGNIFICANT CHANGE UP (ref 6–8.3)
PROTHROM AB SERPL-ACNC: 12.9 SEC — SIGNIFICANT CHANGE UP (ref 10.6–13.6)
RBC # BLD: 5.27 M/UL — SIGNIFICANT CHANGE UP (ref 4.2–5.8)
RBC # FLD: 12.3 % — SIGNIFICANT CHANGE UP (ref 10.3–14.5)
SARS-COV-2 RNA SPEC QL NAA+PROBE: SIGNIFICANT CHANGE UP
SODIUM SERPL-SCNC: 143 MMOL/L — SIGNIFICANT CHANGE UP (ref 135–145)
TROPONIN I SERPL-MCNC: <.015 NG/ML — SIGNIFICANT CHANGE UP (ref 0.01–0.04)
WBC # BLD: 6.18 K/UL — SIGNIFICANT CHANGE UP (ref 3.8–10.5)
WBC # FLD AUTO: 6.18 K/UL — SIGNIFICANT CHANGE UP (ref 3.8–10.5)

## 2021-04-09 PROCEDURE — 99222 1ST HOSP IP/OBS MODERATE 55: CPT

## 2021-04-09 PROCEDURE — 93010 ELECTROCARDIOGRAM REPORT: CPT

## 2021-04-09 PROCEDURE — 70450 CT HEAD/BRAIN W/O DYE: CPT | Mod: 26,MA

## 2021-04-09 PROCEDURE — 99285 EMERGENCY DEPT VISIT HI MDM: CPT

## 2021-04-09 RX ORDER — SODIUM CHLORIDE 9 MG/ML
1000 INJECTION INTRAMUSCULAR; INTRAVENOUS; SUBCUTANEOUS ONCE
Refills: 0 | Status: COMPLETED | OUTPATIENT
Start: 2021-04-09 | End: 2021-04-09

## 2021-04-09 RX ADMIN — SODIUM CHLORIDE 1000 MILLILITER(S): 9 INJECTION INTRAMUSCULAR; INTRAVENOUS; SUBCUTANEOUS at 17:07

## 2021-04-09 RX ADMIN — SODIUM CHLORIDE 1000 MILLILITER(S): 9 INJECTION INTRAMUSCULAR; INTRAVENOUS; SUBCUTANEOUS at 18:30

## 2021-04-09 NOTE — H&P ADULT - NSICDXFAMILYHX_GEN_ALL_CORE_FT
FAMILY HISTORY:  Father  Still living? No  Family history of diabetes mellitus, Age at diagnosis: Age Unknown

## 2021-04-09 NOTE — H&P ADULT - PROBLEM SELECTOR PLAN 1
Telemetry Monitoring, CT Head negative for acute disease   Likely related to bradycardia,  will hold further beta blockers.  Cardio called by ED - Dr Whitley.  Will defer decision for TTE to them

## 2021-04-09 NOTE — PATIENT PROFILE ADULT - NSPROPASSIVESMOKEEXPOSURE_GEN_A_NUR
Care Everywhere: updated  Immunization: updated  Health Maintenance: updated  Media Review: n/a  Legacy Review: n/a  Order placed: n/a  Upcoming appts:n/a        
No

## 2021-04-09 NOTE — ED ADULT NURSE NOTE - CAS EDN DISCHARGE ASSESSMENT
Alert and oriented to person, place and time/Patient baseline mental status/Awake/Dressing clean and dry/No adverse reaction to first time med in ED

## 2021-04-09 NOTE — ED PROVIDER NOTE - OBJECTIVE STATEMENT
60 year old male with PMH of HTN presenting to ED due to syncope episode noted for while  having ear wax removed and then felt lightheaded and had syncope episode. Pt at the time had been given atropine for bradycardia during episode - denies any discomfort at this time. No chest pain and no hx of any syncope in past. 60 year old male with PMH of HTN presenting to ED due to syncope episode noted for while  having ear wax removed and then felt lightheaded and had syncope episode. Pt at the time had been given atropine for bradycardia during episode - denies any discomfort at this time. No chest pain and no hx of any syncope in past. Rate was down to 38 and received atropine x1

## 2021-04-09 NOTE — H&P ADULT - ASSESSMENT
Patient is a 60F with a PMH of HTN who presents to the ED s/p syncope.  Patient states that he recently went to his PMD who noted that his BP was elevated and started him on atenolol 100.  Patient was having trouble with his hearing so he went to an ENT today to be evaluated.  Patient was getting earwax removed when he started to feel weak, lightheaded, and dizzy.  Patient then syncopized at the clinic.  Patient received atropine on site as per ED staff.  Patient denies having chest pain, palpitations, cough, dyspnea, or diaphoresis prior to his syncope.  Patient currently has no complaints, states that prior to today he is in excellent health.  Per ED staff patient was found to have HR of 38 in the field.  Now currently 45-60.  Labs benign.  Will admit to tele    IMPROVE VTE Individual Risk Assessment          RISK                                                          Points  [  ] Previous VTE                                                3  [  ] Thrombophilia                                             2  [  ] Lower limb paralysis                                    2        (unable to hold up >15 seconds)    [  ] Current Cancer                                             2         (within 6 months)  [  ] Immobilization > 24 hrs                              1  [  ] ICU/CCU stay > 24 hours                            1  [  ] Age > 60                                                    1    IMPROVE VTE Score - 0

## 2021-04-09 NOTE — ED PROVIDER NOTE - CARDIAC, MLM
Normal rate, regular rhythm.  Heart sounds S1, S2.  No murmurs, rubs or gallops. bradycardia, regular rhythm.  Heart sounds S1, S2.  No murmurs, rubs or gallops.

## 2021-04-09 NOTE — ED PROVIDER NOTE - CLINICAL SUMMARY MEDICAL DECISION MAKING FREE TEXT BOX
Pt with bradycardia - discussed case with Dr. Branham - states pt was started on labetalol 100mg recently 3 days ago, will admit pt as pt with syncope with persistent sinus bradycardia - will discuss admission with Dr. Lombardo.

## 2021-04-09 NOTE — ED ADULT NURSE NOTE - OBJECTIVE STATEMENT
Pt A&Ox3, states he started taking BP medications 3 days ago. pt c/o severe dizziness denies any headache, blurry vision, states he had wax build up taken out earlier today. Pt arrived to ED, bradycardic denies any chest pain. pt placed on CM upon arrival to ED, labs sent, plan of care discussed pt verbalize understanding. Safety and fall precautions maintained awaiting further evaluation

## 2021-04-09 NOTE — H&P ADULT - NSHPLABSRESULTS_GEN_ALL_CORE
Recent Vitals  T(C): 36.7 (04-09-21 @ 16:16), Max: 36.7 (04-09-21 @ 16:16)  HR: 55 (04-09-21 @ 16:16) (55 - 55)  BP: 101/80 (04-09-21 @ 16:16) (101/80 - 101/80)  RR: 16 (04-09-21 @ 16:16) (16 - 16)  SpO2: 100% (04-09-21 @ 16:16) (100% - 100%)                        16.3   6.18  )-----------( 230      ( 09 Apr 2021 17:27 )             47.7     04-09    143  |  110<H>  |  16  ----------------------------<  84  4.1   |  31  |  1.18    Ca    8.6      09 Apr 2021 17:27    TPro  7.7  /  Alb  3.6  /  TBili  0.4  /  DBili  x   /  AST  7<L>  /  ALT  32  /  AlkPhos  121<H>  04-09    PT/INR - ( 09 Apr 2021 17:27 )   PT: 12.9 sec;   INR: 1.12 ratio         PTT - ( 09 Apr 2021 17:27 )  PTT:28.9 sec  LIVER FUNCTIONS - ( 09 Apr 2021 17:27 )  Alb: 3.6 g/dL / Pro: 7.7 gm/dL / ALK PHOS: 121 U/L / ALT: 32 U/L / AST: 7 U/L / GGT: x               Home Medications:  atenolol-chlorthalidone 100 mg-25 mg oral tablet: 1 tab(s) orally once a day (09 Apr 2021 18:27)

## 2021-04-09 NOTE — ED ADULT TRIAGE NOTE - STATUS:
"  SUBJECTIVE:   Maricarmen Dotson is a 53 year old female who presents to clinic today for the following health issues:    Concern - Concussion follow up  Patient last seen on 8/30 for concussion like symptoms. Since she had a history of concussion and symptoms c/w concussion recurrence, a wait and watch approach was taken. She had recently started working in a [employer], and her  was wondering if the strong magnet was causing her new symptoms of headache, nausea and changes in cognition. She also had a fall prior to this, and it was unclear if she hit her head, though she noted not losing consciousness or hitting her head. For more details, see note by Dr. Erickson on 8/30.    She presents to clinic with persistent and worsening of the above symptoms, along with new tinnitus. She is unable to handle cognitive load, ringing in the ears, severe headaches, confusion, nausea, dizziness, \"feeling drunk.\"    Her headaches are like previous with her last concussion. HA starts more posterior and moves forward to the \"eye balls.\" She then has associated blurry vision and nausea, without vomiting. Headache is not unilateral. HA does not wake her from sleep. HA is happening earlier and earlier in the day. She is taking sulindac and ultram. Ice seems to help the most. She denies falls or hitting her head since last visit.    She reports new bilateral tinnitus, worse on the right. She has tinnitus that wakes her from sleep. She denies hearing loss, however, her  thinks that her hearing is slightly worse. She is also unsteady on her feet. She has a sensation like she's \"on a boat.\" Walking is ok on flat surfaces, however, she \"really needs to think about it.\" She has full sensation in her feet, without numbness or tingling. She has DM, but denies neuropathy. She does report some difficulty with speaking and swallowing as the day progresses, but it is unclear if this is from her \"cognitive difficulties\" as of " "late. For example, \"is it a problem with processing?\" She denies any other stroke like symptoms, urinary incontinence. She has polyuria, which she attributes to hyperglycemia. She denies dysuria, hematuria. She denies vision loss, unilateral scalp pain, jaw pain.    She denies using new medication preceding her symptoms, which began about a month ago.  She has been on ultram \"for years.\"  She started sulindac \"in the spring.\" She also uses voltaren gel. She has stopped aleve.   She did receive the tetanus vaccine before her symptoms began, with a short duration of worsening joint pain, which has resolved.  She frequently gardens and walks in the woods, but has not noted any tick bites.  Before her symptoms began, she was re-painting her house with varnish, but \"had the windows open.\"    Other ROS include  +night time fevers x 2 weeks. Ear temp was 101. No drenching night sweats. No unexplained weight loss.  + otalgia, tooth pain  Denies congestion, sinus pressure  Denies chest pain, dyspnea, abd pain, vomiting, constipation, diarrhea, hematochezia, new rash    Rare ETOH intake as of late, as it makes her symptoms worse. She has not used ETOH recently due to this.    No family hx of neuro conditions  No known thyroid issues    Problem list and histories reviewed & adjusted, as indicated.  Additional history: as documented    Patient Active Problem List   Diagnosis     Irritable bowel syndrome     Diverticulosis of large intestine     Insomnia     Chronic pain syndrome     Hyperlipidemia LDL goal <100     Dyspepsia     Type 2 diabetes, HbA1c goal < 7% (H)     Hepatic injury     Moderate major depression (H)     Overweight     Post concussion syndrome     Plantar fasciitis     Pain in joint, ankle and foot     Migraine headache     Anxiety     Panic attacks     TBI (traumatic brain injury) (H)     Pain in thoracic spine     Nonallopathic lesion of cervical region     Cervicalgia     Lumbago     Statin intolerance     " Type 2 diabetes mellitus with hyperglycemia (H)     Encounter for long-term (current) use of insulin (H)     Diabetes mellitus, type 2 (H)     Fibromyalgia     Past Surgical History:   Procedure Laterality Date     C SPINAL ORTHOSIS,NOS      lumbar surgery     choley  2011     HYSTERECTOMY, CERVIX STATUS UNKNOWN      TVH/BSO       Social History   Substance Use Topics     Smoking status: Never Smoker     Smokeless tobacco: Never Used     Alcohol use 0.0 oz/week     0 Standard drinks or equivalent per week      Comment: maybe once a month     Family History   Problem Relation Age of Onset     DIABETES Mother      type 2     Hypertension Mother      CEREBROVASCULAR DISEASE Mother       stroke age 57     Ovarian Cancer Mother 43     CANCER Mother      cervix     DIABETES Father      type 2     Hypertension Father      GASTROINTESTINAL DISEASE Father      colon polyps     Ovarian Cancer Sister 46     Breast Cancer Sister      Ovarian Cancer Maternal Grandmother 72     CANCER Maternal Grandmother      cervix         Current Outpatient Prescriptions   Medication Sig Dispense Refill     sulindac (CLINORIL) 200 MG tablet Take 200 mg by mouth 2 times daily Takes 1-2 times per day       milk thistle extract 140 MG CAPS capsule        Flax Oil-Fish Oil-Borage Oil ([employer] OMEGA-3 PO) Take 3,400 mg by mouth daily       gabapentin (NEURONTIN) 100 MG capsule Take 4 capsules (400 mg) by mouth daily 90 capsule      diclofenac (VOLTAREN) 1 % GEL topical gel Apply topically nightly as needed for moderate pain Apply 4 grams to knees or 2 grams to hands four times daily using enclosed dosing card. 100 g 1     naproxen sodium (ANAPROX) 220 MG tablet Take 2 tablets (440 mg) by mouth 2 times daily (with meals) 60 tablet      insulin aspart (NOVOLOG FLEXPEN) 100 UNIT/ML injection 1 unit per 4 grams of carbohydrate eaten at each meal +a correction of 1 additional unit per 18 points of blood sugar above 140.  Total daily dose 100  units/day.  Dispense 6 boxes/30 pens for a 90-day supply or 2 boxes/10 pens for a 30-day supply as allowed by insurance. 90 mL 3     insulin degludec (TRESIBA) 200 UNIT/ML pen Inject 68 Units Subcutaneous daily (Patient taking differently: Inject 68 Units Subcutaneous daily ) 18 mL 2     pantothenic acid 500 MG TABS Take 500 mg by mouth daily       B Complex-Biotin-FA (B-COMPLEX PO) Take by mouth daily       insulin pen needle (B-D U/F) 31G X 5 MM Use 5 daily or as directed. 200 each 11     insulin pen needle (B-D U/F) 31G X 5 MM Use 6 daily or as directed. 300 each prn     insulin pen needle (B-D U/F) 31G X 5 MM Use 6  time(s) per day.  Please dispense as BD Pen Needle Mini U/F 31G x 5  each 3     EPINEPHrine (EPIPEN) 0.3 MG/0.3ML injection Inject 0.3 mLs (0.3 mg) into the muscle once as needed for anaphylaxis 2 each 0     STATIN NOT PRESCRIBED, INTENTIONAL, Statin not prescribed intentionally due to Intolerance 0 each 0     traMADol (ULTRAM) 50 MG tablet Take 50 mg by mouth every evening 1-2 tablets in the evening       ASPIRIN NOT PRESCRIBED, INTENTIONAL, continuous prn Antiplatelet medication not prescribed intentionally due to age. 1 each 0     polyethylene glycol (MIRALAX/GLYCOLAX) packet Take 1 packet by mouth daily.       Allergies   Allergen Reactions     Amoxicillin Anaphylaxis     Bee Anaphylaxis     Bee sting       Iodine Anaphylaxis     Severe anaphalatic shock       Sulfa Drugs Anaphylaxis     Tetanus-Diphtheria Toxoids      Rxn was to Tdap-whole body joint pain and fever.  Had similar reaction to Td vaccine 7/28/2017     Zithromax [Azithromycin Dihydrate] Nausea and Vomiting     Contrast Dye      Cyproheptadine      Severe headache, cardiac issues, and dizziness     Humalog [Insulin Lispro]      Causes pancreatitis -      Latex      Skin burn and can't breathe       Metformin      Onglyza [Saxagliptin Hydrochloride]      Fibromyalgia flare     Prochlorperazine      Altered mental status,  hallucinations     Tetracycline Nausea and Vomiting, Hives and Difficulty breathing     Pt called to update today after the visit that she is allergic to the tetracycline-added to her list     Recent Labs   Lab Test  03/30/17   1026  11/30/16   0841  07/20/16   1123  04/12/16   1402  02/05/16   0956   02/21/15   0923   07/08/14   0841  01/31/14   0803   A1C  9.7*  12.2*  9.9*  9.4*   --    < >   --    < >  11.9*  9.3*   LDL   --    --    --   160*   --    --   156*   --   173*  152*   HDL   --    --    --    --    --    --   53   --   41*  54   TRIG   --    --    --    --    --    --   269*   --   263*  180*   ALT   --    --   24  25  24   --    --    < >   --    --    CR   --    --   0.61  0.58   --    --    --    < >   --    --    GFRESTIMATED   --    --   >90  Non  GFR Calc    >90  Non  GFR Calc     --    --    --    < >   --    --    GFRESTBLACK   --    --   >90   GFR Calc    >90   GFR Calc     --    --    --    < >   --    --    POTASSIUM   --    --   3.7  3.8   --    --    --    < >   --    --    TSH   --    --   0.70  0.86   --    --    --    --    --    --     < > = values in this interval not displayed.      BP Readings from Last 3 Encounters:   09/20/17 122/68   08/30/17 138/82   08/16/17 142/90    Wt Readings from Last 3 Encounters:   09/20/17 179 lb 12.8 oz (81.6 kg)   08/30/17 177 lb 1.6 oz (80.3 kg)   08/16/17 182 lb (82.6 kg)        Labs reviewed in EPIC    Reviewed and updated as needed this visit by clinical staff and provioder  Tobacco  Allergies  Meds  Med Hx  Surg Hx  Fam Hx  Soc Hx      ROS:  Constitutional, HEENT, cardiovascular, pulmonary, GI, , musculoskeletal, neuro, skin, endocrine and psych systems are negative, except as otherwise noted.    OBJECTIVE:   /68 (BP Location: Right arm, Patient Position: Chair, Cuff Size: Adult Regular)  Pulse 62  Resp 16  Wt 179 lb 12.8 oz (81.6 kg)  LMP 01/01/1999  SpO2 97%   BMI 30.86 kg/m2  Body mass index is 30.86 kg/(m^2).  GENERAL: healthy, alert and no distress  EYES: Eyes grossly normal to inspection, PERRL and EOMI  HENT: ear canals and TM's normal, no oral lesions, mild OP erythema, tonsils normal  NECK: supple, no adenopathy, no meningismus, no adenopathy  RESP: lungs clear to auscultation - no rales, rhonchi or wheezes  CV: regular rate and rhythm, normal S1 S2, no S3 or S4, no murmur, click or rub, no peripheral edema and peripheral pulses strong  ABDOMEN: soft, nontender, and bowel sounds normal  MS: no gross musculoskeletal defects noted  SKIN: no suspicious lesions or rashes  NEURO: alert, CN II-XII intact, few beats of horizontal nystagmus bilaterally, no vertical nystagmus, patient became very nauseous and had vertiginous symptoms when looking to the right and when a tuning fork was held next to her right ear/placed on her vertex scalp, modified devlin hallpike maneuver was negative, some dysmetric with finger nose finger bilaterally, rapid alternating movements were normal, no pronator drift, hand grasp strong and symmetric, no asterixis, normal strength/tone, normal speech, patellar reflexes 2+, dorsi/plantar flexion strong, ROMBERG +, gait normal  PSYCH: answers questions appropriately    ASSESSMENT/PLAN:     (G44.219) Episodic tension-type headache, not intractable  (primary encounter diagnosis)  (R42) Vertigo  (H93.13) Tinnitus, bilateral  (R11.0) Nausea  Comment: Since patient previously had a concussion and her symptoms last visit were concerning for acute on chronic concussion symptoms, the decision was made to do a wait and watch approach. However, her symptoms have worsened and she has new tinnitus and unilateral exam findings, along with some posterior column findings. It is also concerning that she is having new night time fevers. Her ear exam was normal and she is not having symptoms c/w sinusitis, thus doubt these are contributing. Differential is broad and  would include concussion vs neurological (MS, MG, menierres) vs metabolic vs endocrine (thyroid?) vs vitamin deficiency (B12?) vs infectious (lyme, encephalitis) vs autoimmune (vasculitis?) vs malignancy. She is non toxic appearing and has no exam findings concerning for meningitis. She has no vision loss or unilateral HA, scalp tenderness to suggest GCA. Will start with screening labs to narrow the differential and due to her worsening symptoms, new exam findings, will order a brain MRI.   Plan:   - CBC with platelets and differential  - CMP  - TSH with reflex T4  - vit B12  - salicylate level  - CRP, ESR  - blood culture  - UA w/micro, refelx to   - lyme testing  - MRI brain (one time dose of valium prescribed for claustrophobia)   - if the above tests are negative, refer to neurology  - consider OT, concussion clinic otherwise for presumed concussion symptoms     (R50.9) Fever, unspecified  Comment: Patient reports new night time fevers x 2 weeks. She is non toxic appearing. She has no focal infectious symptoms. Exam not c/w AOM. Exam and hx not c/w sinusitis. No exam findings c/w meningitis. Will order screening labs to narrow differential which would include infection vs rheum (vasculitis).   Plan:   - CBC with diff  - CRP, ESR  - blood culture  - UA  - lyme testing    (F40.240) Claustrophobia  Comment: Hx of claustrophobia with MRI. Will supply with one time dose of valium for planned brain MRI.  Plan: diazepam (VALIUM) 10 MG tablet    Symptoms to seek immediate medical care reviewed with patient  For additional instructions, see AVS   RTC as per above, sooner with red flags    Pt seen and d/w attending physician, Dr. Erickson, who agrees with plan     Billy Magallanes MD  PGY3 Med [facility]    I have seen this patient and examined him in the presence of Dr. Magallanes.  I was present during the key components of the presenting complaints, physical exam, diagnosis, and plan, and fully concur with  the plan as listed below above in the resident's note.    Annamaria Erickson MD  Internal Medicine/Pediatrics       Intact

## 2021-04-09 NOTE — H&P ADULT - HISTORY OF PRESENT ILLNESS
Patient is a 60F with a PMH of HTN who presents to the ED s/p syncope.  Patient states that he recently went to his PMD who noted that his BP was elevated and started him on atenolol 100.  Patient was having trouble with his hearing so he went to an ENT today to be evaluated.  Patient was getting earwax removed when he started to feel weak, lightheaded, and dizzy.  Patient then syncopized at the clinic.  Patient received atropine on site as per ED staff.  Patient denies having chest pain, palpitations, cough, dyspnea, or diaphoresis prior to his syncope.  Patient currently has no complaints, states that prior to today he is in excellent health.  Per ED staff patient was found to have HR of 38 in the field.  Now currently 45-60.  Labs benign.  Will admit to tele

## 2021-04-09 NOTE — H&P ADULT - NSHPREVIEWOFSYSTEMS_GEN_ALL_CORE
Constitutional: no fever, chills, night sweats  Ears: no hearing changes or ear pain,   Nose: no nasal congestion, sinus pain, or rhinorrhea  Cardio: no chest pain, orthopnea, edema, or palpitations  Resp: no dyspnea, cough, wheezing  GI: no nausea, vomiting, diarrhea, constipation, hematochezia, or melena  : no dysuria, urinary frequency, hematuria  MSK: no back pain, neck pain  Skin: no rash, pruritis   Neuro: weakness, dizziness, lightheadedness, syncope positive.    Heme/Lymph: no bruising or bleeding

## 2021-04-10 LAB
COVID-19 SPIKE DOMAIN AB INTERP: POSITIVE
COVID-19 SPIKE DOMAIN ANTIBODY RESULT: >250 U/ML — HIGH
HCV AB S/CO SERPL IA: 0.06 S/CO — SIGNIFICANT CHANGE UP (ref 0–0.99)
HCV AB SERPL-IMP: SIGNIFICANT CHANGE UP
SARS-COV-2 IGG+IGM SERPL QL IA: >250 U/ML — HIGH
SARS-COV-2 IGG+IGM SERPL QL IA: POSITIVE

## 2021-04-10 PROCEDURE — 93880 EXTRACRANIAL BILAT STUDY: CPT | Mod: 26

## 2021-04-10 PROCEDURE — 99233 SBSQ HOSP IP/OBS HIGH 50: CPT

## 2021-04-10 PROCEDURE — 99223 1ST HOSP IP/OBS HIGH 75: CPT

## 2021-04-10 RX ORDER — ENOXAPARIN SODIUM 100 MG/ML
40 INJECTION SUBCUTANEOUS DAILY
Refills: 0 | Status: DISCONTINUED | OUTPATIENT
Start: 2021-04-10 | End: 2021-04-12

## 2021-04-10 NOTE — CONSULT NOTE ADULT - SUBJECTIVE AND OBJECTIVE BOX
RUKHSANA MEDRANO  [id number]    Date of Consult: 4/10/21    CHIEF COMPLAINT:  presyncope and bradycardia  HISTORY OF PRESENT ILLNESS:  60M with no known pmed hx presents from ENT office last night. pt states he was getting cerumen removed from his ears and began to feel lightheaded, pt jody assoc cp sob palpitations or diaphoresis. pt denies syncope. pt denies prev episodes of lightheadedness or syncope  pt found to be bradycardic to 30s here on tele, sinus.   pt states he exercises regularly without cardiac symptoms.     Allergies    No Known Allergies    Intolerances    	    MEDICATIONS:    none              PAST MEDICAL & SURGICAL HISTORY:  Essential hypertension    No significant past surgical history        FAMILY HISTORY:  Family history of diabetes mellitus (Father)        SOCIAL HISTORY:    retired  for the city  denies tob/etoh/drugs.       REVIEW OF SYSTEMS:  See HPI. Otherwise, 10 point ROS done and otherwise negative.    PHYSICAL EXAM:  T(C): 36.9 (04-10-21 @ 10:40), Max: 37 (04-09-21 @ 19:21)  HR: 58 (04-10-21 @ 10:40) (55 - 88)  BP: 121/79 (04-10-21 @ 10:40) (101/80 - 132/79)  RR: 17 (04-10-21 @ 10:40) (16 - 18)  SpO2: 98% (04-10-21 @ 10:40) (92% - 100%)  Wt(kg): --  I&O's Summary    09 Apr 2021 07:01  -  10 Apr 2021 07:00  --------------------------------------------------------  IN: 0 mL / OUT: 600 mL / NET: -600 mL        Appearance: Normal	  HEENT:   Normal oral mucosa, PERRL, EOMI	  Lymphatic: No lymphadenopathy  Cardiovascular: Normal S1 S2, No JVD, No murmurs, No edema  Respiratory: Lungs clear to auscultation	  Psychiatry: A & O x 3, Mood & affect appropriate  Gastrointestinal:  Soft, Non-tender, + BS	  Skin: No rashes, No ecchymoses, No cyanosis	  Neurologic: Non-focal  Extremities: Normal range of motion, No clubbing, cyanosis       LABS:	 	    CBC Full  -  ( 09 Apr 2021 17:27 )  WBC Count : 6.18 K/uL  Hemoglobin : 16.3 g/dL  Hematocrit : 47.7 %  Platelet Count - Automated : 230 K/uL  Mean Cell Volume : 90.5 fl  Mean Cell Hemoglobin : 30.9 pg  Mean Cell Hemoglobin Concentration : 34.2 gm/dL  Auto Neutrophil # : 3.55 K/uL  Auto Lymphocyte # : 1.77 K/uL  Auto Monocyte # : 0.59 K/uL  Auto Eosinophil # : 0.23 K/uL  Auto Basophil # : 0.03 K/uL  Auto Neutrophil % : 57.5 %  Auto Lymphocyte % : 28.6 %  Auto Monocyte % : 9.5 %  Auto Eosinophil % : 3.7 %  Auto Basophil % : 0.5 %    04-09    143  |  110<H>  |  16  ----------------------------<  84  4.1   |  31  |  1.18    Ca    8.6      09 Apr 2021 17:27    TPro  7.7  /  Alb  3.6  /  TBili  0.4  /  DBili  x   /  AST  7<L>  /  ALT  32  /  AlkPhos  121<H>  04-09      proBNP:   Lipid Profile:   HgA1c:   TSH:       CARDIAC MARKERS:  Troponin I, Serum: <.015 ng/mL (04-09 @ 17:27)            TELEMETRY: 	    ECG:  	  RADIOLOGY:  OTHER: 	    PREVIOUS DIAGNOSTIC TESTING:    [ ] Echocardiogram:  [ ]  Catheterization:  [ ] Stress Test:  	  	  ASSESSMENT/PLAN: 	  60M with no known pmhx, presents with presyncopal episode and found to have bradycardia    -pre-syncopal episode at ENT, may be vasovagal, was getting cerumen cleaned out of his ear  -no known hx of cardiac dz, on tele, HR down to low 30s  -d/w pt that he may require a ppm  -would check tte and nuc stress  -cont to monitor on tele  -will cont to follow with you    Hung Whitley MD

## 2021-04-10 NOTE — PROGRESS NOTE ADULT - ASSESSMENT
60F with a PMH of HTN who presents to the ED s/p syncope.      Syncope and collapse.    -contninue Telemetry Monitoring  -CT Head negative for acute disease   Likely related to bradycardia,  will hold further beta blockers  -HR improved   -orthostatic  -carotid doppler   -echo  -awaiting cardiology consult -Dr. Whitley     Essential hypertension.  Plan: BP now within normal range, will continue to monitor off meds.     60F with a PMH of HTN who presents to the ED s/p syncope.      Syncope and collapse.    -contninue Telemetry Monitoring  -CT Head negative for acute disease   Likely related to bradycardia,  will hold further beta blockers  -HR improved   -orthostatic  -carotid doppler   -echo  -as per Cardiology -Dr. Whitley- pt will most likely need a Pacemaker     Essential hypertension.  Plan: BP now within normal range, will continue to monitor off meds.     DVT ppx

## 2021-04-10 NOTE — PROGRESS NOTE ADULT - SUBJECTIVE AND OBJECTIVE BOX
Patient is a 60y old  Male who presents with a chief complaint of syncope, bradycardia (09 Apr 2021 18:42)      INTERVAL HPI/OVERNIGHT EVENTS:    MEDICATIONS  (STANDING):    MEDICATIONS  (PRN):      Allergies    No Known Allergies    Intolerances        REVIEW OF SYSTEMS:  CONSTITUTIONAL: No fever, weight loss, or fatigue  EYES: No eye pain, visual disturbances, or discharge  ENMT:  No difficulty hearing, tinnitus, vertigo; No sinus or throat pain  NECK: No pain or stiffness  BREASTS: No pain, masses, or nipple discharge  RESPIRATORY: No cough, wheezing, chills or hemoptysis; No shortness of breath  CARDIOVASCULAR: No chest pain, palpitations, dizziness, or leg swelling  GASTROINTESTINAL: No abdominal or epigastric pain. No nausea, vomiting, or hematemesis; No diarrhea or constipation. No melena or hematochezia.  GENITOURINARY: No dysuria, frequency, hematuria, or incontinence  NEUROLOGICAL: No headaches, memory loss, loss of strength, numbness, or tremors  SKIN: No itching, burning, rashes, or lesions   LYMPH NODES: No enlarged glands  ENDOCRINE: No heat or cold intolerance; No hair loss  MUSCULOSKELETAL: No joint pain or swelling; No muscle, back, or extremity pain  PSYCHIATRIC: No depression, anxiety, mood swings, or difficulty sleeping  HEME/LYMPH: No easy bruising, or bleeding gums  ALLERGY AND IMMUNOLOGIC: No hives or eczema    Vital Signs Last 24 Hrs  T(C): 36.9 (10 Apr 2021 10:40), Max: 37 (09 Apr 2021 19:21)  T(F): 98.4 (10 Apr 2021 10:40), Max: 98.6 (09 Apr 2021 19:21)  HR: 58 (10 Apr 2021 10:40) (55 - 88)  BP: 121/79 (10 Apr 2021 10:40) (101/80 - 132/79)  BP(mean): --  RR: 17 (10 Apr 2021 10:40) (16 - 18)  SpO2: 98% (10 Apr 2021 10:40) (92% - 100%)    PHYSICAL EXAM:  GENERAL: NAD, well-groomed, well-developed  HEAD:  Atraumatic, Normocephalic  EYES: EOMI, PERRLA, conjunctiva and sclera clear  ENMT: No tonsillar erythema, exudates, or enlargement; Moist mucous membranes, Good dentition, No lesions  NECK: Supple, No JVD, Normal thyroid  NERVOUS SYSTEM:  Alert & Oriented X3, Good concentration; Motor Strength 5/5 B/L upper and lower extremities; DTRs 2+ intact and symmetric  CHEST/LUNG: Clear to percussion bilaterally; No rales, rhonchi, wheezing, or rubs  HEART: Regular rate and rhythm; No murmurs, rubs, or gallops  ABDOMEN: Soft, Nontender, Nondistended; Bowel sounds present  EXTREMITIES:  2+ Peripheral Pulses, No clubbing, cyanosis, or edema  LYMPH: No lymphadenopathy noted  SKIN: No rashes or lesions    LABS:                        16.3   6.18  )-----------( 230      ( 09 Apr 2021 17:27 )             47.7     04-09    143  |  110<H>  |  16  ----------------------------<  84  4.1   |  31  |  1.18    Ca    8.6      09 Apr 2021 17:27    TPro  7.7  /  Alb  3.6  /  TBili  0.4  /  DBili  x   /  AST  7<L>  /  ALT  32  /  AlkPhos  121<H>  04-09    PT/INR - ( 09 Apr 2021 17:27 )   PT: 12.9 sec;   INR: 1.12 ratio         PTT - ( 09 Apr 2021 17:27 )  PTT:28.9 sec    CAPILLARY BLOOD GLUCOSE          RADIOLOGY & ADDITIONAL TESTS:    Imaging Personally Reviewed:  [ ] YES  [ ] NO    Consultant(s) Notes Reviewed:  [ ] YES  [ ] NO    Care Discussed with Consultants/Other Providers [ ] YES  [ ] NO Patient is a 60y old  Male who presents with a chief complaint of syncope, bradycardia (09 Apr 2021 18:42)      INTERVAL HPI/OVERNIGHT EVENTS: Pt doing well, no complaints, denies any pain in his chest  As per telemetry- pt does have episodes of bradycardia- down to 30s in the early morning    MEDICATIONS  (STANDING):    MEDICATIONS  (PRN):      Allergies    No Known Allergies    Intolerances        REVIEW OF SYSTEMS:  CONSTITUTIONAL: No fever, weight loss, or fatigue  EYES: No eye pain, visual disturbances, or discharge  ENMT:  No difficulty hearing, tinnitus, vertigo; No sinus or throat pain  NECK: No pain or stiffness  BREASTS: No pain, masses, or nipple discharge  RESPIRATORY: No cough, wheezing, chills or hemoptysis; No shortness of breath  CARDIOVASCULAR: No chest pain, palpitations, dizziness, or leg swelling  GASTROINTESTINAL: No abdominal or epigastric pain. No nausea, vomiting, or hematemesis; No diarrhea or constipation. No melena or hematochezia.  GENITOURINARY: No dysuria, frequency, hematuria, or incontinence  NEUROLOGICAL: No headaches, memory loss, loss of strength, numbness, or tremors  SKIN: No itching, burning, rashes, or lesions   LYMPH NODES: No enlarged glands  ENDOCRINE: No heat or cold intolerance; No hair loss  MUSCULOSKELETAL: No joint pain or swelling; No muscle, back, or extremity pain  PSYCHIATRIC: No depression, anxiety, mood swings, or difficulty sleeping  HEME/LYMPH: No easy bruising, or bleeding gums  ALLERGY AND IMMUNOLOGIC: No hives or eczema    Vital Signs Last 24 Hrs  T(C): 36.9 (10 Apr 2021 10:40), Max: 37 (09 Apr 2021 19:21)  T(F): 98.4 (10 Apr 2021 10:40), Max: 98.6 (09 Apr 2021 19:21)  HR: 58 (10 Apr 2021 10:40) (55 - 88)  BP: 121/79 (10 Apr 2021 10:40) (101/80 - 132/79)  BP(mean): --  RR: 17 (10 Apr 2021 10:40) (16 - 18)  SpO2: 98% (10 Apr 2021 10:40) (92% - 100%)    PHYSICAL EXAM:  GENERAL: NAD, well-groomed, well-developed  HEAD:  Atraumatic, Normocephalic  EYES: EOMI, PERRLA, conjunctiva and sclera clear  ENMT: No tonsillar erythema, exudates, or enlargement; Moist mucous membranes, Good dentition, No lesions  NECK: Supple, No JVD, Normal thyroid  NERVOUS SYSTEM:  Alert & Oriented X3, Good concentration; Motor Strength 5/5 B/L upper and lower extremities; DTRs 2+ intact and symmetric  CHEST/LUNG: Clear to percussion bilaterally; No rales, rhonchi, wheezing, or rubs  HEART: Regular rate and rhythm; No murmurs, rubs, or gallops  ABDOMEN: Soft, Nontender, Nondistended; Bowel sounds present  EXTREMITIES:  2+ Peripheral Pulses, No clubbing, cyanosis, or edema  LYMPH: No lymphadenopathy noted  SKIN: No rashes or lesions    LABS:                        16.3   6.18  )-----------( 230      ( 09 Apr 2021 17:27 )             47.7     04-09    143  |  110<H>  |  16  ----------------------------<  84  4.1   |  31  |  1.18    Ca    8.6      09 Apr 2021 17:27    TPro  7.7  /  Alb  3.6  /  TBili  0.4  /  DBili  x   /  AST  7<L>  /  ALT  32  /  AlkPhos  121<H>  04-09    PT/INR - ( 09 Apr 2021 17:27 )   PT: 12.9 sec;   INR: 1.12 ratio         PTT - ( 09 Apr 2021 17:27 )  PTT:28.9 sec    CAPILLARY BLOOD GLUCOSE          RADIOLOGY & ADDITIONAL TESTS:    Imaging Personally Reviewed:  [ ] YES  [ ] NO    Consultant(s) Notes Reviewed:  [ ] YES  [ ] NO    Care Discussed with Consultants/Other Providers [ ] YES  [ ] NO

## 2021-04-11 PROCEDURE — 99233 SBSQ HOSP IP/OBS HIGH 50: CPT

## 2021-04-11 NOTE — PROGRESS NOTE ADULT - ASSESSMENT
60F with a PMH of HTN who presents to the ED s/p syncope.      Syncope and collapse.    continue Telemetry Monitoring  -CT Head negative for acute disease   Likely related to bradycardia,  will hold further beta blockers  -HR improved   -orthostatic-negative  -carotid doppler -negative  -echo-awaiting  -as per Cardiology -Dr. Whitley- might need a pacemaker in the future, as for now awaiting echo and nuclear stress test     Essential hypertension.  Plan: BP now within normal range, will continue to monitor off meds.     DVT ppx      DIspo-as per Cardiology, hopefully after stress test

## 2021-04-11 NOTE — PROGRESS NOTE ADULT - SUBJECTIVE AND OBJECTIVE BOX
24H hour events:   no acute events overnight  tele showing SR to 30s overnight  denies sob, lightheadedness or syncope  MEDICATIONS:  enoxaparin Injectable 40 milliGRAM(s) SubCutaneous daily                      PHYSICAL EXAM:  T(C): 36.4 (04-11-21 @ 05:32), Max: 36.6 (04-10-21 @ 17:18)  HR: 70 (04-11-21 @ 05:32) (60 - 76)  BP: 124/70 (04-11-21 @ 05:32) (119/78 - 153/86)  RR: 18 (04-11-21 @ 05:32) (18 - 18)  SpO2: 98% (04-11-21 @ 05:32) (98% - 99%)  Wt(kg): --  I&O's Summary    10 Apr 2021 07:01  -  11 Apr 2021 07:00  --------------------------------------------------------  IN: 720 mL / OUT: 1225 mL / NET: -505 mL    11 Apr 2021 07:01  -  11 Apr 2021 11:01  --------------------------------------------------------  IN: 300 mL / OUT: 0 mL / NET: 300 mL        Appearance: Normal	  HEENT:   Normal oral mucosa, PERRL, EOMI	  Lymphatic: No lymphadenopathy  Cardiovascular: Normal S1 S2, No JVD, No murmurs, No edema  Respiratory: Lungs clear to auscultation	  Psychiatry: A & O x 3, Mood & affect appropriate  Gastrointestinal:  Soft, Non-tender, + BS	  Skin: No rashes, No ecchymoses, No cyanosis	  Neurologic: Non-focal  Extremities: Normal range of motion, No clubbing, cyanosis       LABS:	 	    CBC Full  -  ( 09 Apr 2021 17:27 )  WBC Count : 6.18 K/uL  Hemoglobin : 16.3 g/dL  Hematocrit : 47.7 %  Platelet Count - Automated : 230 K/uL  Mean Cell Volume : 90.5 fl  Mean Cell Hemoglobin : 30.9 pg  Mean Cell Hemoglobin Concentration : 34.2 gm/dL  Auto Neutrophil # : 3.55 K/uL  Auto Lymphocyte # : 1.77 K/uL  Auto Monocyte # : 0.59 K/uL  Auto Eosinophil # : 0.23 K/uL  Auto Basophil # : 0.03 K/uL  Auto Neutrophil % : 57.5 %  Auto Lymphocyte % : 28.6 %  Auto Monocyte % : 9.5 %  Auto Eosinophil % : 3.7 %  Auto Basophil % : 0.5 %    04-09    143  |  110<H>  |  16  ----------------------------<  84  4.1   |  31  |  1.18    Ca    8.6      09 Apr 2021 17:27    TPro  7.7  /  Alb  3.6  /  TBili  0.4  /  DBili  x   /  AST  7<L>  /  ALT  32  /  AlkPhos  121<H>  04-09      proBNP:   Lipid Profile:   HgA1c:   TSH:       CARDIAC MARKERS:  Troponin I, Serum: <.015 ng/mL (04-09 @ 17:27)            TELEMETRY: 	    ECG:  	  RADIOLOGY:  OTHER: 	    PREVIOUS DIAGNOSTIC TESTING:    [ ] Echocardiogram:  [ ]  Catheterization:  [ ] Stress Test:  	  	  ASSESSMENT/PLAN:

## 2021-04-11 NOTE — PROGRESS NOTE ADULT - ASSESSMENT
60M with no known pmhx, presents with presyncopal episode and found to have bradycardia    -pre-syncopal episode at ENT, may be vasovagal, was getting cerumen cleaned out of his ear  -no known hx of cardiac dz, on tele, HR down to low 30s  -d/w pt that he may require a ppm  -would check tte and nuc stress  -cont to monitor on tele  -will cont to follow with you

## 2021-04-11 NOTE — PROGRESS NOTE ADULT - SUBJECTIVE AND OBJECTIVE BOX
Patient is a 60y old  Male who presents with a chief complaint of syncope, bradycardia (11 Apr 2021 11:01)      INTERVAL HPI/OVERNIGHT EVENTS: Pt seen and examined this morning, doing well, has no complaints, denies dizziness, sob, chest pain or any other associated symptoms.     MEDICATIONS  (STANDING):  enoxaparin Injectable 40 milliGRAM(s) SubCutaneous daily    MEDICATIONS  (PRN):      Allergies    No Known Allergies    Intolerances        REVIEW OF SYSTEMS:  CONSTITUTIONAL: No fever, weight loss, or fatigue  EYES: No eye pain, visual disturbances, or discharge  ENMT:  No difficulty hearing, tinnitus, vertigo; No sinus or throat pain  NECK: No pain or stiffness  BREASTS: No pain, masses, or nipple discharge  RESPIRATORY: No cough, wheezing, chills or hemoptysis; No shortness of breath  CARDIOVASCULAR: No chest pain, palpitations, dizziness, or leg swelling  GASTROINTESTINAL: No abdominal or epigastric pain. No nausea, vomiting, or hematemesis; No diarrhea or constipation. No melena or hematochezia.  GENITOURINARY: No dysuria, frequency, hematuria, or incontinence  NEUROLOGICAL: No headaches, memory loss, loss of strength, numbness, or tremors  SKIN: No itching, burning, rashes, or lesions   LYMPH NODES: No enlarged glands  ENDOCRINE: No heat or cold intolerance; No hair loss  MUSCULOSKELETAL: No joint pain or swelling; No muscle, back, or extremity pain  PSYCHIATRIC: No depression, anxiety, mood swings, or difficulty sleeping  HEME/LYMPH: No easy bruising, or bleeding gums  ALLERGY AND IMMUNOLOGIC: No hives or eczema    Vital Signs Last 24 Hrs  T(C): 36.4 (11 Apr 2021 11:09), Max: 36.6 (10 Apr 2021 17:18)  T(F): 97.6 (11 Apr 2021 11:09), Max: 97.9 (10 Apr 2021 17:18)  HR: 77 (11 Apr 2021 11:09) (60 - 77)  BP: 121/79 (11 Apr 2021 11:09) (119/78 - 153/86)  BP(mean): --  RR: 17 (11 Apr 2021 11:09) (17 - 18)  SpO2: 97% (11 Apr 2021 11:09) (97% - 99%)    PHYSICAL EXAM:  GENERAL: NAD, well-groomed, well-developed  HEAD:  Atraumatic, Normocephalic  EYES: EOMI, PERRLA, conjunctiva and sclera clear  ENMT: No tonsillar erythema, exudates, or enlargement; Moist mucous membranes, Good dentition, No lesions  NECK: Supple, No JVD, Normal thyroid  NERVOUS SYSTEM:  Alert & Oriented X3, Good concentration; Motor Strength 5/5 B/L upper and lower extremities; DTRs 2+ intact and symmetric  CHEST/LUNG: Clear to percussion bilaterally; No rales, rhonchi, wheezing, or rubs  HEART: Regular rate and rhythm; No murmurs, rubs, or gallops  ABDOMEN: Soft, Nontender, Nondistended; Bowel sounds present  EXTREMITIES:  2+ Peripheral Pulses, No clubbing, cyanosis, or edema  LYMPH: No lymphadenopathy noted  SKIN: No rashes or lesions    LABS:                        16.3   6.18  )-----------( 230      ( 09 Apr 2021 17:27 )             47.7     04-09    143  |  110<H>  |  16  ----------------------------<  84  4.1   |  31  |  1.18    Ca    8.6      09 Apr 2021 17:27    TPro  7.7  /  Alb  3.6  /  TBili  0.4  /  DBili  x   /  AST  7<L>  /  ALT  32  /  AlkPhos  121<H>  04-09    PT/INR - ( 09 Apr 2021 17:27 )   PT: 12.9 sec;   INR: 1.12 ratio         PTT - ( 09 Apr 2021 17:27 )  PTT:28.9 sec    CAPILLARY BLOOD GLUCOSE          RADIOLOGY & ADDITIONAL TESTS:    Imaging Personally Reviewed:  [ ] YES  [ ] NO    Consultant(s) Notes Reviewed:  [ ] YES  [ ] NO    Care Discussed with Consultants/Other Providers [ ] YES  [ ] NO

## 2021-04-12 ENCOUNTER — TRANSCRIPTION ENCOUNTER (OUTPATIENT)
Age: 61
End: 2021-04-12

## 2021-04-12 VITALS
SYSTOLIC BLOOD PRESSURE: 119 MMHG | TEMPERATURE: 99 F | DIASTOLIC BLOOD PRESSURE: 76 MMHG | OXYGEN SATURATION: 99 % | HEART RATE: 78 BPM | RESPIRATION RATE: 18 BRPM

## 2021-04-12 LAB
ANION GAP SERPL CALC-SCNC: 2 MMOL/L — LOW (ref 5–17)
BUN SERPL-MCNC: 21 MG/DL — SIGNIFICANT CHANGE UP (ref 7–23)
CALCIUM SERPL-MCNC: 8.9 MG/DL — SIGNIFICANT CHANGE UP (ref 8.5–10.1)
CHLORIDE SERPL-SCNC: 105 MMOL/L — SIGNIFICANT CHANGE UP (ref 96–108)
CO2 SERPL-SCNC: 33 MMOL/L — HIGH (ref 22–31)
CREAT SERPL-MCNC: 1.04 MG/DL — SIGNIFICANT CHANGE UP (ref 0.5–1.3)
GLUCOSE SERPL-MCNC: 87 MG/DL — SIGNIFICANT CHANGE UP (ref 70–99)
HCT VFR BLD CALC: 48.4 % — SIGNIFICANT CHANGE UP (ref 39–50)
HGB BLD-MCNC: 16.5 G/DL — SIGNIFICANT CHANGE UP (ref 13–17)
MAGNESIUM SERPL-MCNC: 2 MG/DL — SIGNIFICANT CHANGE UP (ref 1.6–2.6)
MCHC RBC-ENTMCNC: 30.7 PG — SIGNIFICANT CHANGE UP (ref 27–34)
MCHC RBC-ENTMCNC: 34.1 GM/DL — SIGNIFICANT CHANGE UP (ref 32–36)
MCV RBC AUTO: 90.1 FL — SIGNIFICANT CHANGE UP (ref 80–100)
NRBC # BLD: 0 /100 WBCS — SIGNIFICANT CHANGE UP (ref 0–0)
PLATELET # BLD AUTO: 208 K/UL — SIGNIFICANT CHANGE UP (ref 150–400)
POTASSIUM SERPL-MCNC: 3.8 MMOL/L — SIGNIFICANT CHANGE UP (ref 3.5–5.3)
POTASSIUM SERPL-SCNC: 3.8 MMOL/L — SIGNIFICANT CHANGE UP (ref 3.5–5.3)
RBC # BLD: 5.37 M/UL — SIGNIFICANT CHANGE UP (ref 4.2–5.8)
RBC # FLD: 12.2 % — SIGNIFICANT CHANGE UP (ref 10.3–14.5)
SODIUM SERPL-SCNC: 140 MMOL/L — SIGNIFICANT CHANGE UP (ref 135–145)
WBC # BLD: 6.09 K/UL — SIGNIFICANT CHANGE UP (ref 3.8–10.5)
WBC # FLD AUTO: 6.09 K/UL — SIGNIFICANT CHANGE UP (ref 3.8–10.5)

## 2021-04-12 PROCEDURE — 99233 SBSQ HOSP IP/OBS HIGH 50: CPT

## 2021-04-12 PROCEDURE — 99239 HOSP IP/OBS DSCHRG MGMT >30: CPT

## 2021-04-12 PROCEDURE — 93306 TTE W/DOPPLER COMPLETE: CPT | Mod: 26

## 2021-04-12 RX ORDER — ATENOLOL AND CHLORTHALIDONE 50; 25 MG/1; MG/1
1 TABLET ORAL
Qty: 0 | Refills: 0 | DISCHARGE

## 2021-04-12 RX ORDER — REGADENOSON 0.08 MG/ML
0.4 INJECTION, SOLUTION INTRAVENOUS ONCE
Refills: 0 | Status: DISCONTINUED | OUTPATIENT
Start: 2021-04-12 | End: 2021-04-12

## 2021-04-12 NOTE — DISCHARGE NOTE PROVIDER - NSDCCPCAREPLAN_GEN_ALL_CORE_FT
PRINCIPAL DISCHARGE DIAGNOSIS  Diagnosis: Syncope  Assessment and Plan of Treatment:       SECONDARY DISCHARGE DIAGNOSES  Diagnosis: Sinus bradycardia  Assessment and Plan of Treatment:

## 2021-04-12 NOTE — DISCHARGE NOTE PROVIDER - CARE PROVIDER_API CALL
Hung Whitley)  Internal Medicine  [address] [place] [address]  Phone: [phone]  Fax: [phone])-  Follow Up Time: 1-3 days

## 2021-04-12 NOTE — PROGRESS NOTE ADULT - SUBJECTIVE AND OBJECTIVE BOX
24H hour events:   no acute events overnight  remains in sinus 30s overnight, up to 50s during the day  denies cp sob palpitations, dizziness or syncope  MEDICATIONS:  enoxaparin Injectable 40 milliGRAM(s) SubCutaneous daily                      PHYSICAL EXAM:  T(C): 37.1 (04-12-21 @ 05:17), Max: 37.1 (04-12-21 @ 05:17)  HR: 78 (04-12-21 @ 05:17) (70 - 78)  BP: 119/76 (04-12-21 @ 05:17) (119/76 - 126/82)  RR: 18 (04-12-21 @ 05:17) (18 - 18)  SpO2: 99% (04-12-21 @ 05:17) (97% - 99%)  Wt(kg): --  I&O's Summary    11 Apr 2021 07:01  -  12 Apr 2021 07:00  --------------------------------------------------------  IN: 1200 mL / OUT: 701 mL / NET: 499 mL        Appearance: Normal	  HEENT:   Normal oral mucosa, PERRL, EOMI	  Lymphatic: No lymphadenopathy  Cardiovascular: Normal S1 S2, No JVD, No murmurs, No edema  Respiratory: Lungs clear to auscultation	  Psychiatry: A & O x 3, Mood & affect appropriate  Gastrointestinal:  Soft, Non-tender, + BS	  Skin: No rashes, No ecchymoses, No cyanosis	  Neurologic: Non-focal  Extremities: Normal range of motion, No clubbing, cyanosis       LABS:	 	    CBC Full  -  ( 12 Apr 2021 06:52 )  WBC Count : 6.09 K/uL  Hemoglobin : 16.5 g/dL  Hematocrit : 48.4 %  Platelet Count - Automated : 208 K/uL  Mean Cell Volume : 90.1 fl  Mean Cell Hemoglobin : 30.7 pg  Mean Cell Hemoglobin Concentration : 34.1 gm/dL  Auto Neutrophil # : x  Auto Lymphocyte # : x  Auto Monocyte # : x  Auto Eosinophil # : x  Auto Basophil # : x  Auto Neutrophil % : x  Auto Lymphocyte % : x  Auto Monocyte % : x  Auto Eosinophil % : x  Auto Basophil % : x    04-12    140  |  105  |  21  ----------------------------<  87  3.8   |  33<H>  |  1.04    Ca    8.9      12 Apr 2021 06:52  Mg     2.0     04-12        proBNP:   Lipid Profile:   HgA1c:   TSH:       CARDIAC MARKERS:  Troponin I, Serum: <.015 ng/mL (04-09 @ 17:27)            TELEMETRY: 	    ECG:  	  RADIOLOGY:  OTHER: 	    PREVIOUS DIAGNOSTIC TESTING:    [ ] Echocardiogram:  [ ]  Catheterization:  [ ] Stress Test:  	  	  ASSESSMENT/PLAN:

## 2021-04-12 NOTE — DISCHARGE NOTE PROVIDER - HOSPITAL COURSE
60F with a PMH of HTN who presents to the ED s/p syncope.      Syncope and collapse.    continue Telemetry Monitoring  -CT Head negative for acute disease   -continue to hold all beta blockers  -HR improved to 70s during the day, still goes down to 40s/30s when sleeping  -orthostatic-negative  -carotid doppler -negative  -as per Cardiology- no need for Pacemaker emergently, patient insisting on leaving the hospital today- does not want to stay for echo/nuclear stress test. Cardiology ok with patient obtaining testing outpatient.   Patient Raised understanding to follow up with Cardiology asap due to risks including death

## 2021-04-12 NOTE — PROGRESS NOTE ADULT - ASSESSMENT
60M with no known pmhx, presents with presyncopal episode and found to have bradycardia    -pre-syncopal episode at ENT, may be vasovagal, was getting cerumen cleaned out of his ear  -no known hx of cardiac dz, on tele, HR down to low 30s  -d/w pt that he may require a ppm  -TTE grossly unremarkable  -would like to have pt ambulate on treadmill to assess chronotropic competence, symptoms  -cont to monitor on tele  -will cont to follow with you

## 2021-04-16 DIAGNOSIS — R55 SYNCOPE AND COLLAPSE: ICD-10-CM

## 2021-04-16 DIAGNOSIS — I10 ESSENTIAL (PRIMARY) HYPERTENSION: ICD-10-CM

## 2021-04-16 DIAGNOSIS — R00.1 BRADYCARDIA, UNSPECIFIED: ICD-10-CM

## 2021-08-02 NOTE — ED PROVIDER NOTE - DURATION
Please advise: patient unable to tolerate empagliflozin. Patient stopped taking after 1 dose.  Shortness of breath 7 hours after taking week(s)/2

## 2021-11-08 ENCOUNTER — RESULT REVIEW (OUTPATIENT)
Age: 61
End: 2021-11-08

## 2021-11-30 ENCOUNTER — APPOINTMENT (OUTPATIENT)
Dept: GASTROENTEROLOGY | Facility: CLINIC | Age: 61
End: 2021-11-30

## 2024-03-27 NOTE — PHYSICAL THERAPY INITIAL EVALUATION ADULT - NUMBER OF STAIRS, REHAB EVAL
Detail Level: Simple Additional Notes: Discussed options for next visit, the right superior posterior helix keloid should be observed to see its reaction from the 3rd Kenalog treatment. Patient was advised that potential surgical removal followed by radiation may be her next best option Render Risk Assessment In Note?: no 6

## 2024-05-07 NOTE — ED ADULT TRIAGE NOTE - NS ED NURSE BANDS TYPE
Name band; [Extension] : extension [de-identified] : Constitutional; Appears well, no apparent distress\par  Ability to communicate: Normal \par  Respiratory: non-labored breathing\par  Skin: No rash noted\par  Head: Normocephalic, atraumatic\par  Neck: no visible thyroid enlargement\par  Eyes: Extraocular movements intact\par  Neurologic: Alert and oriented x3\par  Psychiatric: normal mood, affect and behavior \par  \par   [] : no ecchymosis

## 2024-08-05 NOTE — H&P ADULT - ASSESSMENT
The patient is a 42y Male complaining of abdominal pain.
58 year old female with PMH of HTN; presented after fall down stairs at home with some head trauma and LOC after feeling dizzy. Patient restarted taking his BP meds (atenolol-chlorthalidone) about 3 days ago after not taking for about 8 months or so. HR on telemetry as low as 40's. Admitted for symptomatic bradycardia, likely due to atenolol side effect.

## 2025-02-07 ENCOUNTER — EMERGENCY (EMERGENCY)
Facility: HOSPITAL | Age: 65
LOS: 0 days | Discharge: HOME | End: 2025-02-07
Attending: STUDENT IN AN ORGANIZED HEALTH CARE EDUCATION/TRAINING PROGRAM
Payer: MEDICARE

## 2025-02-07 VITALS
DIASTOLIC BLOOD PRESSURE: 90 MMHG | OXYGEN SATURATION: 98 % | SYSTOLIC BLOOD PRESSURE: 168 MMHG | TEMPERATURE: 97 F | RESPIRATION RATE: 17 BRPM | HEART RATE: 62 BPM

## 2025-02-07 VITALS
HEIGHT: 69 IN | SYSTOLIC BLOOD PRESSURE: 118 MMHG | RESPIRATION RATE: 18 BRPM | TEMPERATURE: 97 F | WEIGHT: 169.98 LBS | OXYGEN SATURATION: 99 % | HEART RATE: 81 BPM | DIASTOLIC BLOOD PRESSURE: 70 MMHG

## 2025-02-07 DIAGNOSIS — S12.120A OTHER DISPLACED DENS FRACTURE, INITIAL ENCOUNTER FOR CLOSED FRACTURE: ICD-10-CM
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
## 2025-02-07 DIAGNOSIS — R55 SYNCOPE AND COLLAPSE: ICD-10-CM

## 2025-02-07 DIAGNOSIS — F10.129 ALCOHOL ABUSE WITH INTOXICATION, UNSPECIFIED: ICD-10-CM
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
## 2025-02-07 DIAGNOSIS — M54.2 CERVICALGIA: ICD-10-CM

## 2025-02-07 LAB
ALBUMIN SERPL ELPH-MCNC: 3.2 G/DL — LOW (ref 3.3–5)
ALP SERPL-CCNC: 123 U/L — HIGH (ref 40–120)
ALT FLD-CCNC: 17 U/L — SIGNIFICANT CHANGE UP (ref 12–78)
ANION GAP SERPL CALC-SCNC: 6 MMOL/L — SIGNIFICANT CHANGE UP (ref 5–17)
AST SERPL-CCNC: 18 U/L — SIGNIFICANT CHANGE UP (ref 15–37)
BASOPHILS # BLD AUTO: 0.03 K/UL — SIGNIFICANT CHANGE UP (ref 0–0.2)
BASOPHILS NFR BLD AUTO: 0.5 % — SIGNIFICANT CHANGE UP (ref 0–2)
BILIRUB SERPL-MCNC: 0.4 MG/DL — SIGNIFICANT CHANGE UP (ref 0.2–1.2)
BUN SERPL-MCNC: 13 MG/DL — SIGNIFICANT CHANGE UP (ref 7–23)
CALCIUM SERPL-MCNC: 8.2 MG/DL — LOW (ref 8.5–10.1)
CHLORIDE SERPL-SCNC: 111 MMOL/L — HIGH (ref 96–108)
CO2 SERPL-SCNC: 25 MMOL/L — SIGNIFICANT CHANGE UP (ref 22–31)
CREAT SERPL-MCNC: 1.18 MG/DL — SIGNIFICANT CHANGE UP (ref 0.5–1.3)
EGFR: 68 ML/MIN/1.73M2 — SIGNIFICANT CHANGE UP
EOSINOPHIL # BLD AUTO: 0.11 K/UL — SIGNIFICANT CHANGE UP (ref 0–0.5)
EOSINOPHIL NFR BLD AUTO: 1.9 % — SIGNIFICANT CHANGE UP (ref 0–6)
ETHANOL SERPL-MCNC: 83 MG/DL — HIGH (ref 0–10)
GLUCOSE SERPL-MCNC: 75 MG/DL — SIGNIFICANT CHANGE UP (ref 70–99)
HCT VFR BLD CALC: 39.3 % — SIGNIFICANT CHANGE UP (ref 39–50)
HGB BLD-MCNC: 13.5 G/DL — SIGNIFICANT CHANGE UP (ref 13–17)
IMM GRANULOCYTES NFR BLD AUTO: 0.5 % — SIGNIFICANT CHANGE UP (ref 0–0.9)
LYMPHOCYTES # BLD AUTO: 1.11 K/UL — SIGNIFICANT CHANGE UP (ref 1–3.3)
LYMPHOCYTES # BLD AUTO: 18.8 % — SIGNIFICANT CHANGE UP (ref 13–44)
MCHC RBC-ENTMCNC: 30.8 PG — SIGNIFICANT CHANGE UP (ref 27–34)
MCHC RBC-ENTMCNC: 34.4 G/DL — SIGNIFICANT CHANGE UP (ref 32–36)
MCV RBC AUTO: 89.5 FL — SIGNIFICANT CHANGE UP (ref 80–100)
MONOCYTES # BLD AUTO: 0.58 K/UL — SIGNIFICANT CHANGE UP (ref 0–0.9)
MONOCYTES NFR BLD AUTO: 9.8 % — SIGNIFICANT CHANGE UP (ref 2–14)
NEUTROPHILS # BLD AUTO: 4.04 K/UL — SIGNIFICANT CHANGE UP (ref 1.8–7.4)
NEUTROPHILS NFR BLD AUTO: 68.5 % — SIGNIFICANT CHANGE UP (ref 43–77)
NRBC # BLD: 0 /100 WBCS — SIGNIFICANT CHANGE UP (ref 0–0)
NRBC BLD-RTO: 0 /100 WBCS — SIGNIFICANT CHANGE UP (ref 0–0)
PLATELET # BLD AUTO: 180 K/UL — SIGNIFICANT CHANGE UP (ref 150–400)
POTASSIUM SERPL-MCNC: 3.9 MMOL/L — SIGNIFICANT CHANGE UP (ref 3.5–5.3)
POTASSIUM SERPL-SCNC: 3.9 MMOL/L — SIGNIFICANT CHANGE UP (ref 3.5–5.3)
PROT SERPL-MCNC: 7 GM/DL — SIGNIFICANT CHANGE UP (ref 6–8.3)
RBC # BLD: 4.39 M/UL — SIGNIFICANT CHANGE UP (ref 4.2–5.8)
RBC # FLD: 13.8 % — SIGNIFICANT CHANGE UP (ref 10.3–14.5)
SODIUM SERPL-SCNC: 142 MMOL/L — SIGNIFICANT CHANGE UP (ref 135–145)
TROPONIN I, HIGH SENSITIVITY RESULT: 5.1 NG/L — SIGNIFICANT CHANGE UP
WBC # BLD: 5.9 K/UL — SIGNIFICANT CHANGE UP (ref 3.8–10.5)
WBC # FLD AUTO: 5.9 K/UL — SIGNIFICANT CHANGE UP (ref 3.8–10.5)

## 2025-02-07 PROCEDURE — 93010 ELECTROCARDIOGRAM REPORT: CPT

## 2025-02-07 PROCEDURE — 70450 CT HEAD/BRAIN W/O DYE: CPT | Mod: 26

## 2025-02-07 PROCEDURE — 72125 CT NECK SPINE W/O DYE: CPT | Mod: 26

## 2025-02-07 PROCEDURE — 71045 X-RAY EXAM CHEST 1 VIEW: CPT | Mod: 26

## 2025-02-07 PROCEDURE — 72050 X-RAY EXAM NECK SPINE 4/5VWS: CPT | Mod: 26

## 2025-02-07 PROCEDURE — 99285 EMERGENCY DEPT VISIT HI MDM: CPT

## 2025-02-07 RX ORDER — ACETAMINOPHEN 160 MG/5ML
1000 SUSPENSION ORAL ONCE
Refills: 0 | Status: COMPLETED | OUTPATIENT
Start: 2025-02-07 | End: 2025-02-07

## 2025-02-07 RX ADMIN — ACETAMINOPHEN 1000 MILLIGRAM(S): 160 SUSPENSION ORAL at 15:37

## 2025-02-07 RX ADMIN — ACETAMINOPHEN 400 MILLIGRAM(S): 160 SUSPENSION ORAL at 11:36

## 2025-02-07 NOTE — ED ADULT TRIAGE NOTE - NSWEIGHTCALCTOOLDRUG_GEN_A_CORE
Ventricular Rate : 83  Atrial Rate : 83  P-R Interval : 168  QRS Duration : 84  Q-T Interval : 394  QTC Calculation(Bezet) : 462  P Axis : 73  R Axis : -14  T Axis : 25  Diagnosis : Normal sinus rhythm  Low voltage QRS  Borderline ECG  When compared with ECG of 18-OCT-2012 00:24,  PVCs are no longer present  Confirmed by MD Torres, Atul ([id number]) on 5/31/2019 10:07:48 AM  
 used

## 2025-02-07 NOTE — ED ADULT NURSE NOTE - NSFALLHARMRISKINTERV_ED_ALL_ED
Assistance OOB with selected safe patient handling equipment if applicable/Communicate risk of Fall with Harm to all staff, patient, and family/Orthostatic vital signs/Provide visual cue: red socks, yellow wristband, yellow gown, etc/Reinforce activity limits and safety measures with patient and family/Bed in lowest position, wheels locked, appropriate side rails in place/Call bell, personal items and telephone in reach/Instruct patient to call for assistance before getting out of bed/chair/stretcher/Non-slip footwear applied when patient is off stretcher/[place] to call system/Physically safe environment - no spills, clutter or unnecessary equipment/Purposeful Proactive Rounding/Room/bathroom lighting operational, light cord in reach

## 2025-02-07 NOTE — CONSULT NOTE ADULT - SUBJECTIVE AND OBJECTIVE BOX
Problem: Discharge Planning  Goal: Discharge to home or other facility with appropriate resources  12/8/2023 0021 by Juan Carlos Dent RN  Outcome: Progressing  Flowsheets (Taken 12/8/2023 0015)  Discharge to home or other facility with appropriate resources: Identify barriers to discharge with patient and caregiver  12/7/2023 1106 by Brandy Tierney RN  Outcome: Progressing     Problem: Safety - Adult  Goal: Free from fall injury  12/8/2023 0021 by Juan Carlos Dent RN  Outcome: Progressing  12/7/2023 1106 by Brandy Tierney RN  Outcome: Progressing 64y Male presents with neck pain. Patient suffered what seems to be syncopal fall - does not recall the exact events leading to him waking up today. Endorses pain localized to neck. Denies other orthopedic injuries at this time. Denies associated numbness/tingling/weakness at this time. Denies saddle anesthesia. Denies changes in bowel/bladder function. Denies fevers/chills. Denies difficulty with hand fine motor tasks. Last ambulated today without assistance.      PAST MEDICAL & SURGICAL HISTORY:  Essential hypertension      No significant past surgical history        Home Medications:    Allergies    No Known Allergies    Intolerances                              13.5   5.90  )-----------( 180      ( 07 Feb 2025 07:15 )             39.3     02-07    142  |  111[H]  |  13  ----------------------------<  75  3.9   |  25  |  1.18    Ca    8.2[L]      07 Feb 2025 07:15    TPro  7.0  /  Alb  3.2[L]  /  TBili  0.4  /  DBili  x   /  AST  18  /  ALT  17  /  AlkPhos  123[H]  02-07      Urinalysis Basic - ( 07 Feb 2025 07:15 )    Color: x / Appearance: x / SG: x / pH: x  Gluc: 75 mg/dL / Ketone: x  / Bili: x / Urobili: x   Blood: x / Protein: x / Nitrite: x   Leuk Esterase: x / RBC: x / WBC x   Sq Epi: x / Non Sq Epi: x / Bacteria: x        Vital Signs Last 24 Hrs  T(C): 36.4 (07 Feb 2025 11:09), Max: 36.7 (07 Feb 2025 07:19)  T(F): 97.6 (07 Feb 2025 11:09), Max: 98 (07 Feb 2025 07:19)  HR: 68 (07 Feb 2025 11:09) (68 - 81)  BP: 137/82 (07 Feb 2025 11:09) (112/78 - 137/82)  BP(mean): --  RR: 20 (07 Feb 2025 11:09) (16 - 20)  SpO2: 97% (07 Feb 2025 11:09) (97% - 99%)    Parameters below as of 07 Feb 2025 11:09  Patient On (Oxygen Delivery Method): room air        EXAM:  General: NAD, awake and alert, pleasant  Spine: no open skin or gross deformities, NTTP, no palpable stepoffs  Motor:                   C5                C6              C7               C8           T1   R            5/5                5/5            5/5             5/5          5/5  L             5/5               5/5             5/5             5/5          5/5                L2             L3             L4               L5            S1  R         5/5           5/5          5/5             5/5           5/5  L          5/5          5/5           5/5             5/5           5/5    Sensory:            C5         C6         C7      C8       T1        (0=absent, 1=impaired, 2=normal, NT=not testable)  R         2            2           2        2         2  L          2            2           2        2         2               L2          L3         L4      L5       S1         (0=absent, 1=impaired, 2=normal, NT=not testable)  R         2            2            2        2        2  L          2            2           2        2         2    Negative Duque sign bilaterally  Negative clonus bilaterally  Downgoing Babinski bilaterally      Imaging:   CT (personal read): Acute minimally to mildly displaced C2 fx - read as type 2 but most c/w type 3 given involvement of cancellous body and joint involvement. No other obvious acute fxs. No obvious dislocations or clear instability based on CT.      Assessment/Plan:   64y Male with type 3 C2 fx  -Pt wants to leave ED - discussed all R/B/A of injury including risk for paralysis, pt aware  -No acute concern for acute neurologic deficits or overt instability at this time based on exam  -C-collar at all times  -Multimodal analgesia including NSAIDs, muscle relaxants, gabapentin, and PRN opioids as indicated  -WBAT/OOB with assistive devices as needed  -Spine precautions: must maintain neck in C-collar at all times  -No acute orthopedic surgery indicated at this time  -Ortho stable for DC - pt aware to return to ED if any developments of acute weakness or neurologic symptoms  -F/u outpatient w/ Dr. Daljit Mcneil in 2-3 days  -D/w Dr. Mcneil, will update plan as needed

## 2025-02-07 NOTE — ED PROVIDER NOTE - NSFOLLOWUPINSTRUCTIONS_ED_ALL_ED_FT
You were seen in the ED for a dens fracture.    Wear c-collar at all times.     Follow up with orthopedics in the office.    Odontoid = A peg-like part of the second bone in the neck  Fracture = A break in a bone    A type II odontoid fracture is a break that occurs through a specific part of C2, the second bone in the neck.    Bones of the spine are called vertebrae. The bone involved in odontoid fracture is the second vertebra, C2, high up in the neck. The joint between C2 and the vertebra above, C1, has an outstanding range of motion. This is the joint that allows the head to rotate from side to side, bend forward and bend backward.    One of the unique features of this joint is a peg of bone called the odontoid process (sometimes called the dens). It is about the size of the tip of a pinky finger. The odontoid process sticks up from the front of C2 and fits into a groove in C1.    In an odontoid fracture, that peg of bone is broken. In a Type I odontoid fracture, just the tip of the bone is broken. In a Type II fracture, the most common type, the peg is broken at its base. In a Type III fracture, the bone is broken below the base of the peg.    Some fractures are considered stable, and some are unstable. In a stable fracture, the bone does not move out of its normal anatomical position and alignment. A stable fracture may “set” and heal itself. In an unstable fracture, the bone is more likely to move out of its normal position and alignment. Type II fractures are considered the least stable of the odontoid fractures. This makes them the most likely to require surgery.    Symptoms  A fracture that compresses the spinal cord may injure its delicate fibers. This type of injury to the spinal cord is called myelopathy, and it may lead to neurological symptoms like pain or numbness in the back, legs, and arms.    Diagnosis  The doctor will take a complete medical history and perform a complete physical examination.    If a Type II Odontoid Fracture is suspected, the doctor may order the following diagnostic procedures:    X-rays: test that uses invisible electromagnetic energy beams to produce images of internal tissues, bones, and organs on film.  Magnetic resonance imaging (MRI): a diagnostic procedure that uses a combination of large magnets, radiofrequencies, and a computer to produce detailed images of organs and structures within the body.  Computed tomography scan (CT scan): a diagnostic imaging procedure that uses a combination of X-rays and computer technology to produce detailed images of the body. A CT scan shows detailed images of any part of the body, including the bones, muscles, fat, and organs. CT scans are more detailed than general X-rays.  Nuclear bone scan: a diagnostic procedure in which a radioactive substance is injected into the body to measure activity in the bones. (The amount of radiation is small–less than the radiation in half of one CT scan.) This scan helps identify damaged bones.  Risk Factors  Type II odontoid fractures occur when the cervical spine is hyperflexed (bent severely backward) or hyperextended (bent severely forward). Hyperflexion and hyperextension can be caused by trauma such as a fall or whiplash from a motor vehicle accident.    Age plays a big role in the incidence of odontoid fractures–they are the most common type of cervical spine fractures in patients older than 70. This is probably due to the increased risk of falls in the elderly, as well as the greater incidence of osteoporosis (a condition of weak and brittle bones).    Type II odontoid fractures can also occur in younger patients, most commonly as the result of trauma from a motor vehicle accident.    Treatments  Treatment options for type II odontoid fractures can be nonsurgical or surgical. Nonsurgical measures include immobilization, prevent or restrict movement, in a cervical collar or halo vest.    Surgery may be required if the fracture has resulted in neurologic symptoms and/or the spine has become unstable. One surgical option is a procedure called anterior screw fixation. During this procedure, the surgeon approaches the vertebra from the front of the neck (an anterior approach) and places an internal fixation, like screws, to hold the vertebrae in place while the bone heals.    Another option is a similar procedure performed from the back of the neck (a posterior approach). During this procedure, the surgeon fuses C1 and C2, the first and second vertebrae.    The treatment of stable odontoid fractures remains controversial. The surgeon will determine the best treatment for each patient and each situation.

## 2025-02-07 NOTE — ED PROVIDER NOTE - NS_EDPROVIDERDISPOUSERTYPE_ED_A_ED
Attending Attestation (For Attendings USE Only)...

## 2025-02-07 NOTE — ED ADULT TRIAGE NOTE - CHIEF COMPLAINT QUOTE
bibems from home for syncopal episode.  Per EMS, pts wife woke up to go to work, heard a loud thump upstairs in bathroom, pt was unresponsive to the point where wife started CPR.  on scene, pt bp 69/41 w/ 50 HR.  16g IV to left forearm, about 1L NS infused. Pt Aox4 in triage, denies any pain or discomfort at this time.  .  no pmhx, nkda bibems from home for syncopal episode.  Per EMS, pts wife woke up to go to work, heard a loud thump upstairs in bathroom, pt was unresponsive to the point where wife started CPR.  on scene, pt bp 69/41 w/ 50 HR.  16g IV to left forearm, about 1L NS infused. Pt Aox4 in triage, endorses having a couple of drinks prior, denies any pain or discomfort at this time.  .  no pmhx, nkda

## 2025-02-07 NOTE — ED ADULT NURSE NOTE - NS ED NOTE ABUSE RESPONSE YN
Patient appeared calmer and brighter tonight.  She denied all mental health issues, physical problems, and medication side effects.  All statements were reality-based.  Patient was appropriately social with peers.   Yes

## 2025-02-07 NOTE — ED PROVIDER NOTE - PROGRESS NOTE DETAILS
CT with +dens fracture. Patient evaluated by ortho and stable for discharge for follow up in c-collar. Neuro intact. Feliberto Henry DO

## 2025-02-07 NOTE — ED PROVIDER NOTE - PROVIDER TOKENS
PROVIDER:[TOKEN:[[id number]],FOLLOWUP:[1-3 Days]],PROVIDER:[TOKEN:[[id number]],FOLLOWUP:[1-3 Days]]

## 2025-02-07 NOTE — ED PROVIDER NOTE - PATIENT PORTAL LINK FT
You can access the FollowMyHealth Patient Portal offered by [facility] by registering at the following website: [email or web address]. By joining [facility]’s FollowMyHealth portal, you will also be able to view your health information using other applications (apps) compatible with our system.

## 2025-02-07 NOTE — ED ADULT NURSE NOTE - OBJECTIVE STATEMENT
Pt BIBEMS s/p syncopal episode. Pt currently AOx4, BIBEMS s/p syncopal episode. Pt states he does not remember what happened this morning, pt states he "does not remember passing out". Pt states yesterday night between 10 and 1 am he had "a few drinks of corinna". Pt states this AM he was trying to go to bed and does not remember what happened after that. Pt denies taking blood thinners. Pt denies n/v/fever/chills/sob. Pt denies PMH. EKG completed and given to ED provider. Pt placed on cardiac and spo2 monitor.

## 2025-02-07 NOTE — ED ADULT NURSE NOTE - CAS EDN DISCHARGE INTERVENTIONS
Pt to BL22 in wheelchair from lobby, pt changing into gown, family at bedside, chart up for ERP.    IV discontinued, cath removed intact

## 2025-02-07 NOTE — ED PROVIDER NOTE - AXIS
We will call you if anything is abnormal from your testing today. Please contact the office or seek immediate care if you develop fever > 101.0, severe lower abdominal pain or heavy vaginal bleeding (soaking 2 or more pads per hour). Thanks for coming to see us today and letting us take care of you! Normal

## 2025-02-07 NOTE — ED PROVIDER NOTE - CARE PLAN
Principal Discharge DX:	Syncope  Secondary Diagnosis:	Alcohol intoxication   1 Principal Discharge DX:	Dens fracture  Secondary Diagnosis:	Alcohol intoxication  Secondary Diagnosis:	Syncope

## 2025-02-07 NOTE — ED ADULT NURSE REASSESSMENT NOTE - NS ED NURSE REASSESS COMMENT FT1
Patient is alert and oriented x 4. Awaiting for lab draw. Stable. Patient is alert and oriented x 4. Awaiting for lab draw. Stable. CIWA score 1.

## 2025-02-07 NOTE — ED ADULT NURSE REASSESSMENT NOTE - ANCILLARY STATUS
awaiting lab draw
Detail Level: Detailed
Quality 358: Patient-Centered Surgical Risk Assessment And Communication: Documentation of patient-specific risk assessment with a risk calculator based on multi-institutional clinical data, the specific risk calculator used, and communication of risk assessment from risk calculator with the patient or family.

## 2025-02-07 NOTE — ED PROVIDER NOTE - CLINICAL SUMMARY MEDICAL DECISION MAKING FREE TEXT BOX
65 y/o M who denies PMH here today s/p syncopal episode. Patient states he went to sleep after drinking last night and woke up with people around him. He is unsure of what happened in between. No prior syncopal episodes. No chest pain, dyspnea, N/V/D.  Per EMS, patient's wife woke up to go to work when she heard a thump in the bathroom and patient was not responding. BP was soft on scene and patient was given fluids.    In the ED, vitals now stable.    Patient is awake and alert without acute complaints.    GENERAL: Awake, alert, NAD  HEENT: NC/AT, moist mucous membranes, PERRL, EOMI  LUNGS: CTAB, no wheezes or crackles   CARDIAC: RRR, no m/r/g  ABDOMEN: Soft, non tender, non distended, no rebound, no guarding  BACK: No midline spinal tenderness, no CVA tenderness  EXT: No edema, no calf tenderness, no deformities.  NEURO: A&Ox3. Moving all extremities.  SKIN: Warm and dry. No rash.  PSYCH: Normal affect.    Suspect syncope from alcohol. EKG is NSR.  Will obtain labs/CXR to evaluate for other causes of syncope

## 2025-02-07 NOTE — ED PROVIDER NOTE - CARE PROVIDER_API CALL
Salvador Thacker  Orthopaedic Surgery  [address], [address]  [place] [address]  Phone: [phone]  Fax: [phone]  Follow Up Time: 1-3 Days    Catarino Coburn  Orthopaedic Surgery  [address]  [place] [address]  Phone: [phone]  Fax: [phone]  Follow Up Time: 1-3 Days

## 2025-02-07 NOTE — ED ADULT NURSE NOTE - CHIEF COMPLAINT QUOTE
bibems from home for syncopal episode.  Per EMS, pts wife woke up to go to work, heard a loud thump upstairs in bathroom, pt was unresponsive to the point where wife started CPR.  on scene, pt bp 69/41 w/ 50 HR.  16g IV to left forearm, about 1L NS infused. Pt Aox4 in triage, endorses having a couple of drinks prior, denies any pain or discomfort at this time.  .  no pmhx, nkda

## 2025-02-10 ENCOUNTER — APPOINTMENT (OUTPATIENT)
Dept: ORTHOPEDIC SURGERY | Facility: CLINIC | Age: 65
End: 2025-02-10
Payer: MEDICARE

## 2025-02-10 DIAGNOSIS — S12.100A UNSPECIFIED DISPLACED FRACTURE OF SECOND CERVICAL VERTEBRA, INITIAL ENCOUNTER FOR CLOSED FRACTURE: ICD-10-CM

## 2025-02-10 PROCEDURE — 99204 OFFICE O/P NEW MOD 45 MIN: CPT

## 2025-04-30 NOTE — ED ADULT NURSE NOTE - NSIMPLEMENTINTERV_GEN_ALL_ED
Occupational Therapy Discharge    Visit Type: Discharge Summary  Visit: 13  Referring Provider: Jada Zaidi MD  Medical Diagnosis (from order): M25.511, G89.29 - Chronic right shoulder pain   Patient alert and oriented X3.    SUBJECTIVE                                                                                                               She reports that she is happy with her progress- ready for discharge     A little ache this morning but better than what she was in the past  Functional Change: Able to complete exercises easier- more control over her arm able to reach into cupboards and put dishes away easier.   Current Functional Limitations: Working with her hair- getting her arm up; but it is improving and no clicking     OBJECTIVE                                                                                                                     Range of Motion (ROM)   (degrees unless noted; active unless noted; norms in ( ); negative=lacking to 0, positive=beyond 0)  Shoulder:    - Flexion (180):         Right: 117    - Scaption:         Right: 115    - Internal Rotation:        - Behind Back:            Right: thorasic    - External Rotation:       - at 0°:             Right: 26       - Behind the Head:            Right: PIP to middle of head                        Outcome/Assessments  Outcome Measures:   Quick Disabilities of the Arm, Shoulder and Hand: QuickDash Total Score (Score will not calculate if more then 2 questions are left blank): 15.91   (scored 0-100; a higher score indicates greater disability) see flowsheet for additional documentation        Treatment     Therapeutic Exercise  Arm bike level 2, 1 min reversals, total of 4 min     Completed measurements for assessment/progress note     Supine  Flexion 1# x10 reps x3 sets (cues to keep shoulder retracted- some clicking noted)  Horizontal abduction red band x10 reps   Serratus raises single red band x10 reps     Side lying  Scaption 1#  x10 reps x2 sets  Horizontal abduction x5 reps increased crepitus- hold at this time     Standing  Finger ladder x3 sets x10 sec hold at top   Wall push ups x10 reps (increased creptius)  Wall slides x10 reps (improved tolerance and no pain)    *upgraded home exercise program with red band     Skilled input: verbal instruction/cues, demonstration, posture correction and facilitation    Writer verbally educated and received verbal consent for hand placement, positioning of patient, and techniques to be performed today from patient for hand placement and palpation for techniques as described above and how they are pertinent to the patient's plan of care.  Home Exercise Program  Print    Access Code: [email or web address]  URL: [email or web address]  Date: 04/30/2025  Prepared by: Lyubov Veloz    Exercises  - Standing Shoulder Row with Anchored Resistance  - 1 x daily - 7 x weekly - 3 sets - 10 reps  - Supine Shoulder Alphabet  - 1 x daily - 7 x weekly - 3 sets - 10 reps  - Supine Shoulder Horizontal Abduction with Resistance  - 1 x daily - 7 x weekly - 3 sets - 10 reps  - Supine Shoulder Flexion Extension AAROM with Dowel  - 1 x daily - 7 x weekly - 3 sets - 10 reps  - Supine Shoulder Press with Dowel  - 1 x daily - 7 x weekly - 3 sets - 10 reps  - Sidelying Shoulder External Rotation  - 1 x daily - 7 x weekly - 3 sets - 10 reps  - Sidelying Shoulder Scaption  - 1 x daily - 7 x weekly - 3 sets - 10 reps      ASSESSMENT                                                                                                            Gains in skilled therapy to date not as expected due to severe arthritis and limited cognition to complete exercises appropriately, however she was able to improve in ROM and strength in modified positions. She is also able to complete her daily activities without pain.   Patient attends therapy as recommended.  Patient reports performing HEP as prescribed.  Progress toward  discharge/long term goals (see below for specific status updates): steady progress  Education:   - Results of above outlined education: Verbalizes understanding, Demonstrates understanding and Needs reinforcement    PLAN                                                                                                                           Discharge from skilled therapy with instructions/recommendations to: continue home exercise program    Suggestions for next session as indicated: Progress per plan of care    Goals  Long Term Goals: to be met by end of plan of care   1. Patient will reach out to side and at and above shoulder height with proper scapulohumeral rhythm, with patient reported manageable pain/symptoms of 1/10 for reaching and moving items in cabinets during meal preparation.   (125 deg scaption/flexion)Status: partially met Slight decrease in ROM this date, Still limited in strength for carrying/moving items, but getting better. She is able to achieve this ROM in supine  2. Patient will demonstrate or report hair washing/styling for 10 seconds,with patient reported manageable pain/symptoms of 1/10 in order to wash hair without compensation or limitation. Status: partially met  Able to wash hair; but still struggling with using curling iron and hot rollers   3. Patient will be independent with progressed and modified home exercise program. Status: met Progressing with increasing reps; modifying due to clicking in shoulder and neck pain  4. Patient will complete upper body dressing without compensatory techniques independently, without pain/symptoms. Status: met   5.  Quick DASH: Patient will score 20 or lower on The Quick DASH to indicate a decreased level of impairment with lifting, carrying, household and self-care activity. (minimal clinically important difference: 14 of calculated score)Status: met 15.91      Therapy procedure time and total treatment time can be found documented on the Time Entry  flowsheet   Implemented All Fall Risk Interventions:  [place] to call system. Call bell, personal items and telephone within reach. Instruct patient to call for assistance. Room bathroom lighting operational. Non-slip footwear when patient is off stretcher. Physically safe environment: no spills, clutter or unnecessary equipment. Stretcher in lowest position, wheels locked, appropriate side rails in place. Provide visual cue, wrist band, yellow gown, etc. Monitor gait and stability. Monitor for mental status changes and reorient to person, place, and time. Review medications for side effects contributing to fall risk. Reinforce activity limits and safety measures with patient and family.